# Patient Record
Sex: MALE | Race: BLACK OR AFRICAN AMERICAN | NOT HISPANIC OR LATINO | URBAN - METROPOLITAN AREA
[De-identification: names, ages, dates, MRNs, and addresses within clinical notes are randomized per-mention and may not be internally consistent; named-entity substitution may affect disease eponyms.]

---

## 2017-02-16 ENCOUNTER — HOSPITAL ENCOUNTER (OUTPATIENT)
Dept: MEDSURG UNIT | Facility: HOSPITAL | Age: 27
End: 2017-02-17

## 2020-02-10 ENCOUNTER — HISTORICAL (OUTPATIENT)
Dept: ADMINISTRATIVE | Facility: HOSPITAL | Age: 30
End: 2020-02-10

## 2022-04-07 ENCOUNTER — HISTORICAL (OUTPATIENT)
Dept: ADMINISTRATIVE | Facility: HOSPITAL | Age: 32
End: 2022-04-07
Payer: COMMERCIAL

## 2022-04-23 VITALS
WEIGHT: 223.56 LBS | BODY MASS INDEX: 27.8 KG/M2 | HEIGHT: 75 IN | SYSTOLIC BLOOD PRESSURE: 108 MMHG | DIASTOLIC BLOOD PRESSURE: 70 MMHG

## 2022-05-01 NOTE — HISTORICAL [facility]ER
This is a historical note converted from Agnes. Formatting and pictures may have been removed.  Please reference Agnes for original formatting and attached multimedia. Chief Complaint  Pt c/o right knee pain, tore ACL 4 years ago and had sx with Dr. Kirby. Referred by Dr. Clark  History of Present Illness  Patient comes in today complaint of right knee pain. ?Patient states over last couple weeks he is noted some soreness in his right knee. ?He had a previous ACL reconstruction and meniscus?over 4 years ago.? He has pain with bending of his knee. ?He denies any swelling or instability he denies other complaints.  Physical Exam  Vitals & Measurements  HR:?64(Peripheral)? BP:?130/76?  HT:?190?cm? WT:?97.0?kg? BMI:?26.87?  Right lower extremity compartment soft and warm. ?Skin is intact with no signs or symptoms of DVT or infection. ?There is no effusion his previous incisions well-healed his motion 0 to 115 degrees. ?He has some mild soreness?along the medial joint line questionable McMurrays. ?Negative anterior drawer negative Lachmans?he is otherwise stable to stressing he is neurovascular intact distally.? X-rays 3 views right knee demonstrate no obvious fracture dislocation.  Assessment/Plan  1.?Right ACL tear?S83.511A  ?At this time we discussed his physical exam and?x-ray findings.? We will start with physical therapy to gain his strength, quad strength. ?We have discussed some low impact activities. ?We have discussed anti-inflammatories with appropriate precautions.? We have discussed an MRI if he does not improve, would like to see him back in 4 weeks to see how he is progressing.  2.?Medial meniscus tear?S83.249A  Referrals  Clinic Follow up, *Est. 03/09/20 3:00:00 CDT, Order for future visit, Right ACL tear  Medial meniscus tear, [facility]  PT/OT External Referral, 02/10/20 11:00:00 CST, Right ACL tear  Medial meniscus tear, Evaluate and Treat, 3 X Week, Patient has IV, Standard Precautions    Problem List/Past Medical History  Ongoing  BMI 27.0-27.9,adult  Knee joint pain  Well adult exam  Historical  Finger sprain  Procedure/Surgical History  Arthroscopically aided anterior cruciate ligament repair/augmentation or reconstruction (06/07/2016)  Arthroscopy Knee (Right) (06/07/2016)  Arthroscopy, knee, surgical; with meniscus repair (medial AND lateral) (06/07/2016)  Injection, anesthetic agent; femoral nerve, single (06/07/2016)  Introduction of Local Anesthetic into Peripheral Nerves and Plexi, Percutaneous Approach (06/07/2016)  Repair Right Knee Joint, Percutaneous Endoscopic Approach (06/07/2016)  Supplement Right Knee Bursa and Ligament with Autologous Tissue Substitute, Percutaneous Endoscopic Approach (06/07/2016)  CLOSURE OF SKIN AND SUBCUTANEOUS TISSUE OTHER SITES (02/13/2015)  Simple repair of superficial wounds of scalp, neck, axillae, external genitalia, trunk and/or extremities (including hands and feet); 2.6 cm to 7.5 cm (02/13/2015)  Simple repair of superficial wounds of face, ears, eyelids, nose, lips and/or mucous membranes; 2.5 cm or less. (01/22/2015)  Suture of laceration of lip (01/22/2015)  Right Hand Surgery   Medications  Mobic 15 mg oral tablet, 15 mg= 1 tab(s), Oral, Daily  Allergies  No Known Allergies  Social History  Abuse/Neglect  No, 02/10/2020  Alcohol - Low Risk, 01/22/2015  Current, 1-2 times per week, 02/16/2017  Employment/School  Employed, Work/School description: [employer]. Highest education level: High school., 01/22/2020  Home/Environment  Lives with Significant other. Living situation: Home/Independent., 01/22/2020  Nutrition/Health  Regular, 06/02/2016  Sexual  Sexually active: Yes., 01/22/2020  Spiritual/Cultural  Non denomination, 01/22/2020  Substance Use - Denies Substance Abuse, 07/24/2012  Never, 06/02/2016  Tobacco - Denies Tobacco Use, 07/24/2012  Never (less than 100 in lifetime), N/A, Household tobacco concerns: No., 02/10/2020  Family History  Diabetes  mellitus type 2: Mother.  Hypertension.: Mother.  Immunizations  Vaccine Date Status Comments   influenza virus vaccine, inactivated 02/17/2017 Given Other : ?scan did not save on administration date   tetanus/diphtheria/pertussis, acel(Tdap) 01/22/2015 Given    Health Maintenance  Health Maintenance  ???Pending?(in the next year)  ??? ??OverDue  ??? ? ? ?Diabetes Screening due??and every?  ??? ??Due In Future?  ??? ? ? ?Alcohol Misuse Screening not due until??01/01/21??and every 1??year(s)  ??? ? ? ?Obesity Screening not due until??01/01/21??and every 1??year(s)  ??? ? ? ?Depression Screening not due until??01/21/21??and every 1??year(s)  ??? ? ? ?ADL Screening not due until??01/22/21??and every 1??year(s)  ??? ? ? ?Blood Pressure Screening not due until??02/09/21??and every 1??year(s)  ??? ? ? ?Body Mass Index Check not due until??02/09/21??and every 1??year(s)  ???Satisfied?(in the past 1 year)  ??? ??Satisfied?  ??? ? ? ?ADL Screening on??01/22/20.??Satisfied by Kayce Leger LPN  ??? ? ? ?Alcohol Misuse Screening on??01/22/20.??Satisfied by Kayce Leger LPN  ??? ? ? ?Blood Pressure Screening on??02/10/20.??Satisfied by Gumaro Hebert  ??? ? ? ?Body Mass Index Check on??02/10/20.??Satisfied by Gumaro Hebert  ??? ? ? ?Depression Screening on??01/22/20.??Satisfied by Kayce Leger LPN  ??? ? ? ?Diabetes Screening on??01/22/20.??Satisfied by [email or web address]  ??? ? ? ?Influenza Vaccine on??02/10/20.??Satisfied by Gumaro Hebert  ??? ? ? ?Obesity Screening on??02/10/20.??Satisfied by Gumaro Hebert  ?

## 2023-05-02 ENCOUNTER — HOSPITAL ENCOUNTER (EMERGENCY)
Facility: OTHER | Age: 33
Discharge: HOME | End: 2023-05-02
Attending: EMERGENCY MEDICINE
Payer: MEDICAID

## 2023-05-02 VITALS
OXYGEN SATURATION: 96 % | DIASTOLIC BLOOD PRESSURE: 75 MMHG | TEMPERATURE: 98 F | SYSTOLIC BLOOD PRESSURE: 142 MMHG | HEART RATE: 77 BPM | RESPIRATION RATE: 18 BRPM | WEIGHT: 305 LBS | BODY MASS INDEX: 42.7 KG/M2 | HEIGHT: 71 IN

## 2023-05-02 DIAGNOSIS — K08.89 PAIN, DENTAL: Primary | ICD-10-CM

## 2023-05-02 DIAGNOSIS — H92.09 EAR ACHE: ICD-10-CM

## 2023-05-02 PROCEDURE — 25000003 PHARM REV CODE 250: Performed by: EMERGENCY MEDICINE

## 2023-05-02 PROCEDURE — 99284 EMERGENCY DEPT VISIT MOD MDM: CPT

## 2023-05-02 PROCEDURE — 63600175 PHARM REV CODE 636 W HCPCS: Performed by: EMERGENCY MEDICINE

## 2023-05-02 PROCEDURE — 96372 THER/PROPH/DIAG INJ SC/IM: CPT | Performed by: EMERGENCY MEDICINE

## 2023-05-02 RX ORDER — IBUPROFEN 600 MG/1
600 TABLET ORAL EVERY 6 HOURS PRN
Qty: 20 TABLET | Refills: 0 | Status: SHIPPED | OUTPATIENT
Start: 2023-05-02

## 2023-05-02 RX ORDER — PENICILLIN V POTASSIUM 500 MG/1
500 TABLET, FILM COATED ORAL 4 TIMES DAILY
Qty: 40 TABLET | Refills: 0 | Status: SHIPPED | OUTPATIENT
Start: 2023-05-02 | End: 2023-05-09

## 2023-05-02 RX ORDER — PENICILLIN V POTASSIUM 500 MG/1
500 TABLET, FILM COATED ORAL
Status: COMPLETED | OUTPATIENT
Start: 2023-05-02 | End: 2023-05-02

## 2023-05-02 RX ORDER — KETOROLAC TROMETHAMINE 30 MG/ML
10 INJECTION, SOLUTION INTRAMUSCULAR; INTRAVENOUS
Status: COMPLETED | OUTPATIENT
Start: 2023-05-02 | End: 2023-05-02

## 2023-05-02 RX ORDER — ACETAMINOPHEN 500 MG
1000 TABLET ORAL
Status: COMPLETED | OUTPATIENT
Start: 2023-05-02 | End: 2023-05-02

## 2023-05-02 RX ORDER — ACETAMINOPHEN 500 MG
1000 TABLET ORAL EVERY 6 HOURS PRN
Qty: 50 TABLET | Refills: 0 | Status: SHIPPED | OUTPATIENT
Start: 2023-05-02

## 2023-05-02 RX ADMIN — ACETAMINOPHEN 1000 MG: 500 TABLET, FILM COATED ORAL at 10:05

## 2023-05-02 RX ADMIN — PENICILLIN V POTASSIUM 500 MG: 500 TABLET, FILM COATED ORAL at 10:05

## 2023-05-02 RX ADMIN — KETOROLAC TROMETHAMINE 10 MG: 30 INJECTION, SOLUTION INTRAMUSCULAR; INTRAVENOUS at 10:05

## 2023-05-03 NOTE — FIRST PROVIDER EVALUATION
Emergency Department TeleTriage Encounter Note      CHIEF COMPLAINT    Chief Complaint   Patient presents with    Dental Pain     Reports right bottom and top gum pain x 1 month       VITAL SIGNS   Initial Vitals [05/02/23 1903]   BP Pulse Resp Temp SpO2   (!) 140/95 84 18 98.8 °F (37.1 °C) 97 %      MAP       --            ALLERGIES    Review of patient's allergies indicates:  No Known Allergies    PROVIDER TRIAGE NOTE  Patient presents with complaint of dental pain for a few weeks with associated ear pain and facial swelling. Denied fever. Reports bad tooth.      Phy:   Constitutional: well nourished, well developed, appearing stated age, NAD   HEENT: NCAT, symmetrical lids, No obvious facial deformity.  Normal phonation. Normal Conjunctiva   Neck: NAROM   Respiratory: Normal effort.  No obvious use of accessory muscles   Musculoskeletal: Moved upper extremities well   Neuro: Alert, answers questions appropriately    Psych: appropriate mood and affect      Initial orders will be placed and care will be transferred to an alternate provider when patient is roomed for a full evaluation. Any additional orders and the final disposition will be determined by that provider.        ORDERS  Labs Reviewed - No data to display    ED Orders (720h ago, onward)      None              Virtual Visit Note: The provider triage portion of this emergency department evaluation and documentation was performed via [employer], a HIPAA-compliant telemedicine application, in concert with a tele-presenter in the room. A face to face patient evaluation with one of my colleagues will occur once the patient is placed in an emergency department room.      DISCLAIMER: This note was prepared with [employer] ASA voice recognition transcription software. Garbled syntax, mangled pronouns, and other bizarre constructions may be attributed to that software system.

## 2023-05-03 NOTE — ED PROVIDER NOTES
Encounter Date: 5/2/2023    SCRIBE #1 NOTE: I, Jeannette Woods, am scribing for, and in the presence of,  Americo Benson MD.     History     Chief Complaint   Patient presents with    Dental Pain     Reports right bottom and top gum pain x 1 month     This is a 32 y.o. male who presents with complaint of right sided tooth pain that worsened today. Patient reports to have on and off tooth pain, which he believes is his wisdom teeth starting to break in. He states the right sided tooth pain worsened today and began radiating to his right ear. He notes laying or leaning on his right side makes the pain worsens and feels it throbbing in his ear. He reports the pain has also caused throat pain. Patient does not report recent dentist visits or upcoming appointments. Patient reports a PMHx of smoking.     The history is provided by the patient.   Review of patient's allergies indicates:  No Known Allergies  History reviewed. No pertinent past medical history.  History reviewed. No pertinent surgical history.  History reviewed. No pertinent family history.     Review of Systems    Constitutional-no fever  HEENT-positive for dental problem, positive for ear pain (right ear), no congestion  Eyes-no redness  Respiratory-no shortness of breath  Cardio-no chest pain  GI-no abdominal pain  Endocrine-no cold intolerance  -no difficulty urinating  MSK-no myalgias  Skin-no rashes  Allergy-no environmental allergy  Neurologic-, no headache  Hematology-no swollen nodes  Behavioral-no confusion     Physical Exam     Initial Vitals [05/02/23 1903]   BP Pulse Resp Temp SpO2   (!) 140/95 84 18 98.8 °F (37.1 °C) 97 %      MAP       --         Physical Exam    Constitutional: Well appearing, no distress.  Eyes: Conjunctivae normal.  ENT       Head: Normocephalic, atraumatic.       Nose: No congestion.       Mouth/Throat: poor dentition, moderate TTP among many teeth with caries  Hematological/Lymphatic/Immunilogical: No cervical  lymphadenopathy.  Cardiovascular: Normal rate, regular rhythm. Normal and symmetric distal pulses.  Respiratory: Normal respiratory effort. Breath sounds are normal.  Gastrointestinal: Soft, nontender.   Musculoskeletal: Normal range of motion in all extremities. No obvious deformities or swelling.  Neurologic: Alert, oriented. Normal speech and language. No gross focal neurologic deficits are appreciated.  Skin: Skin is warm, dry. No rash noted.  Psychiatric: Mood and affect are normal.      ED Course   Procedures  Labs Reviewed - No data to display       Imaging Results    None          Medications   ketorolac injection 9.999 mg (9.999 mg Intramuscular Given 5/2/23 2211)   acetaminophen tablet 1,000 mg (1,000 mg Oral Given 5/2/23 2211)   penicillin v potassium tablet 500 mg (500 mg Oral Given 5/2/23 2211)     Medical Decision Making:   History:   Old Medical Records: I decided to obtain old medical records.  Old Records Summarized: records from clinic visits.  Differential Diagnosis:   Pulpitis, abscess,  ED Management:  Obese man with dental pain, chronic poor dentition.   No sign of ludwigs angina at this time. Plan for symptom care.   DC return in case of worsening.         Scribe Attestation:   Scribe #1: I performed the above scribed service and the documentation accurately describes the services I performed. I attest to the accuracy of the note.            Physician Attestation for Scribe: I, americo benson, reviewed documentation as scribed in my presence, which is both accurate and complete.        Clinical Impression:   Final diagnoses:  [K08.89] Pain, dental (Primary)  [H92.09] Ear ache        ED Disposition Condition    Discharge Stable          ED Prescriptions       Medication Sig Dispense Start Date End Date Auth. Provider    penicillin v potassium (VEETID) 500 MG tablet Take 1 tablet (500 mg total) by mouth 4 (four) times daily. for 7 days 40 tablet 5/2/2023 5/9/2023 Americo Benson MD     ibuprofen (ADVIL,MOTRIN) 600 MG tablet Take 1 tablet (600 mg total) by mouth every 6 (six) hours as needed for Pain. 20 tablet 5/2/2023 -- Americo Benson MD    acetaminophen (TYLENOL) 500 MG tablet Take 2 tablets (1,000 mg total) by mouth every 6 (six) hours as needed. 50 tablet 5/2/2023 -- Americo Benson MD          Follow-up Information       Follow up With Specialties Details Why Contact Info    Your Dentist  Schedule an appointment as soon as possible for a visit  For a follow up visit about today              Americo Benson MD  05/04/23 1942

## 2023-05-03 NOTE — DISCHARGE INSTRUCTIONS
Mr. Smith,    Thank you for letting me care for you today! It was nice meeting you, and I hope you feel better soon.   If you would like access to your chart and what was done today please utilize the [facility].   Please come back to [facility] for all of your future medical needs.    Our goal in the emergency department is to always give you outstanding care and exceptional service. You may receive a survey by mail or e-mail in the next week regarding your experience in our ED. We would greatly appreciate you completing and returning the survey. Your feedback provides us with a way to recognize our staff who give very good care and it helps us learn how to improve when your experience was below our aspiration of excellence.     Sincerely,    Americo Benson MD  Board Certified Emergency Physician

## 2023-05-03 NOTE — ED TRIAGE NOTES
Pt arrived with c/o R sided dental pain and R sided ear pain x 1 month, worsening today.  Took 2 Ibuprofen with no relief.  Pt denies any fever.  Pt reports inflammation to his gums on the R side, but denies any drainage.

## 2023-11-19 ENCOUNTER — HOSPITAL ENCOUNTER (EMERGENCY)
Facility: HOSPITAL | Age: 33
Discharge: HOME | End: 2023-11-19
Attending: INTERNAL MEDICINE
Payer: MEDICAID

## 2023-11-19 VITALS
RESPIRATION RATE: 16 BRPM | HEIGHT: 70 IN | HEART RATE: 69 BPM | TEMPERATURE: 98 F | WEIGHT: 315 LBS | DIASTOLIC BLOOD PRESSURE: 89 MMHG | SYSTOLIC BLOOD PRESSURE: 148 MMHG | OXYGEN SATURATION: 98 % | BODY MASS INDEX: 45.1 KG/M2

## 2023-11-19 DIAGNOSIS — F19.10 SUBSTANCE ABUSE: Primary | ICD-10-CM

## 2023-11-19 LAB
AMPHET UR QL SCN: NEGATIVE
BARBITURATE SCN PRESENT UR: NEGATIVE
BENZODIAZ UR QL SCN: NEGATIVE
CANNABINOIDS UR QL SCN: NEGATIVE
COCAINE UR QL SCN: NEGATIVE
ETHANOL SERPL-MCNC: <10 MG/DL
FENTANYL UR QL SCN: POSITIVE
HOLD SPECIMEN: NORMAL
HOLD SPECIMEN: NORMAL
MDMA UR QL SCN: NEGATIVE
OPIATES UR QL SCN: NEGATIVE
PCP UR QL: NEGATIVE
PH UR: 8 [PH] (ref 3–11)

## 2023-11-19 PROCEDURE — 99283 EMERGENCY DEPT VISIT LOW MDM: CPT

## 2023-11-19 PROCEDURE — 63600175 PHARM REV CODE 636 W HCPCS: Performed by: INTERNAL MEDICINE

## 2023-11-19 PROCEDURE — 82077 ASSAY SPEC XCP UR&BREATH IA: CPT | Performed by: INTERNAL MEDICINE

## 2023-11-19 PROCEDURE — 25000003 PHARM REV CODE 250: Performed by: INTERNAL MEDICINE

## 2023-11-19 PROCEDURE — 80307 DRUG TEST PRSMV CHEM ANLYZR: CPT | Performed by: INTERNAL MEDICINE

## 2023-11-19 RX ORDER — ARIPIPRAZOLE 5 MG/1
5 TABLET ORAL
COMMUNITY
Start: 2023-06-01

## 2023-11-19 RX ORDER — BUPRENORPHINE AND NALOXONE 2; .5 MG/1; MG/1
1 FILM, SOLUBLE BUCCAL; SUBLINGUAL DAILY
Status: DISCONTINUED | OUTPATIENT
Start: 2023-11-19 | End: 2023-11-19 | Stop reason: HOSPADM

## 2023-11-19 RX ORDER — ONDANSETRON 4 MG/1
4 TABLET, ORALLY DISINTEGRATING ORAL
Status: COMPLETED | OUTPATIENT
Start: 2023-11-19 | End: 2023-11-19

## 2023-11-19 RX ADMIN — BUPRENORPHINE AND NALOXONE 1 FILM: 2; .5 FILM BUCCAL; SUBLINGUAL at 02:11

## 2023-11-19 RX ADMIN — ONDANSETRON 4 MG: 4 TABLET, ORALLY DISINTEGRATING ORAL at 02:11

## 2023-11-19 NOTE — ED PROVIDER NOTES
Encounter Date: 11/19/2023       History     Chief Complaint   Patient presents with    Suicidal    Addiction Problem     Patient reports detoxing from Heroin; last use was last night. Reports feeling down and suicidal this morning.     Presents requesting help with detox and rehabilitation for heroin abuse.    The history is provided by the patient.     Review of patient's allergies indicates:  No Known Allergies  History reviewed. No pertinent past medical history.  History reviewed. No pertinent surgical history.  History reviewed. No pertinent family history.  Social History     Tobacco Use    Smoking status: Every Day     Current packs/day: 0.50     Types: Cigarettes    Smokeless tobacco: Never   Substance Use Topics    Drug use: Yes     Comment: Heroin     Review of Systems   Constitutional:  Negative for fever.   HENT:  Negative for sore throat.    Respiratory:  Negative for shortness of breath.    Cardiovascular:  Negative for chest pain.   Gastrointestinal:  Negative for nausea.   Genitourinary:  Negative for dysuria.   Musculoskeletal:  Negative for back pain.   Skin:  Negative for rash.   Neurological:  Negative for weakness.   Hematological:  Does not bruise/bleed easily.   Psychiatric/Behavioral:  Negative for self-injury and suicidal ideas.    All other systems reviewed and are negative.      Physical Exam     Initial Vitals [11/19/23 1347]   BP Pulse Resp Temp SpO2   (!) 156/83 72 14 98.8 °F (37.1 °C) 97 %      MAP       --         Physical Exam    Nursing note and vitals reviewed.  Constitutional: He appears well-developed and well-nourished. No distress.   HENT:   Head: Normocephalic and atraumatic.   Nose: Nose normal.   Mouth/Throat: Oropharynx is clear and moist. No oropharyngeal exudate.   Eyes: Conjunctivae and EOM are normal. Pupils are equal, round, and reactive to light.   Neck: Neck supple. No thyromegaly present. No JVD present.   Normal range of motion.  Cardiovascular:  Normal rate,  regular rhythm, normal heart sounds and intact distal pulses.           Pulmonary/Chest: Breath sounds normal. No respiratory distress.   Abdominal: Abdomen is soft. Bowel sounds are normal. He exhibits no distension. There is no abdominal tenderness. There is no rebound and no guarding.   Musculoskeletal:         General: No edema. Normal range of motion.      Cervical back: Normal range of motion and neck supple.     Neurological: He is alert and oriented to person, place, and time. He has normal strength. GCS score is 15. GCS eye subscore is 4. GCS verbal subscore is 5. GCS motor subscore is 6.   Skin: Skin is warm and dry. No rash noted.   Psychiatric: His behavior is normal. Thought content normal.         ED Course   Procedures  Labs Reviewed   DRUG SCREEN, URINE (BEAKER) - Abnormal; Notable for the following components:       Result Value    Fentanyl, Urine Positive (*)     All other components within normal limits    Narrative:     Cut off concentrations:    Amphetamines - 1000 ng/ml  Barbiturates - 200 ng/ml  Benzodiazepine - 200 ng/ml  Cannabinoids (THC) - 50 ng/ml  Cocaine - 300 ng/ml  Fentanyl - 1.0 ng/ml  MDMA - 500 ng/ml  Opiates - 300 ng/ml   Phencyclidine (PCP) - 25 ng/ml    Specimen submitted for drug analysis and tested for pH and specific gravity in order to evaluate sample integrity. Suspect tampering if specific gravity is <1.003 and/or pH is not within the range of 4.5 - 8.0  False negatives may result form substances such as bleach added to urine.  False positives may result for the presence of a substance with similar chemical structure to the drug or its metabolite.    This test provides only a PRELIMINARY analytical test result. A more specific alternate chemical method must be used in order to obtain a confirmed analytical result. Gas chromatography/mass spectrometry (GC/MS) is the preferred confirmatory method. Other chemical confirmation methods are available. Clinical consideration and  professional judgement should be applied to any drug of abuse test result, particularly when preliminary positive results are used.    Positive results will be confirmed only at the physicians request. Unconfirmed screening results are to be used only for medical purposes (treatment).        ALCOHOL,MEDICAL (ETHANOL) - Normal   EXTRA TUBES    Narrative:     The following orders were created for panel order EXTRA TUBES.  Procedure                               Abnormality         Status                     ---------                               -----------         ------                     Lavender Top Hold[[id number]]                                Final result               Gold Top Hold[[id number]]                                    Final result                 Please view results for these tests on the individual orders.   LAVENDER TOP HOLD   GOLD TOP HOLD          Imaging Results    None          Medications   ondansetron disintegrating tablet 4 mg (4 mg Oral Given 11/19/23 1406)     Medical Decision Making  Amount and/or Complexity of Data Reviewed  Labs: ordered. Decision-making details documented in ED Course.  Discussion of management or test interpretation with external provider(s): [facility] service consulted for rehabilitation placement    Risk  Prescription drug management.                        I provided the patient with counseling regarding opioid abuse complications, consequences and alternatives of treatment. We spoke about rehabilitation programs and a list of available alternatives was provided. I provide the patient with a prescription for naloxone and instructions of use were given. I encourage the patient to teach his friends and family members in naloxone emergency use.  The patient understood his condition and the importance of obtaining adequate addiction counseling and rehabilitation help.       [facility] service consulted and patent placed in a rehabilitation center. Narcan was provided  bedside      Clinical Impression:  Final diagnoses:  [F19.10] Substance abuse (Primary)          ED Disposition Condition    Discharge           ED Prescriptions    None       Follow-up Information       Follow up With Specialties Details Why Contact Info    [facility] - Emergency Dept Emergency Medicine Schedule an appointment as soon as possible for a visit   [address] [id number] [phone]    [facility]  Schedule an appointment as soon as possible for a visit in 2 months  [address] [address]    [facility] Behavioral Health, Psychiatry, Psychology Schedule an appointment as soon as possible for a visit in 1 month  [address] [place] [id number] [phone]               Trev Linder MD  11/19/23 1920       Trev Linder MD  11/19/23 1921

## 2024-08-25 ENCOUNTER — HOSPITAL ENCOUNTER (EMERGENCY)
Facility: HOSPITAL | Age: 34
Discharge: HOME | End: 2024-08-25
Attending: EMERGENCY MEDICINE
Payer: MEDICAID

## 2024-08-25 VITALS
SYSTOLIC BLOOD PRESSURE: 135 MMHG | BODY MASS INDEX: 45.2 KG/M2 | TEMPERATURE: 98 F | HEIGHT: 70 IN | RESPIRATION RATE: 20 BRPM | DIASTOLIC BLOOD PRESSURE: 85 MMHG | OXYGEN SATURATION: 100 % | HEART RATE: 78 BPM

## 2024-08-25 DIAGNOSIS — F19.10 DRUG ABUSE: Primary | ICD-10-CM

## 2024-08-25 LAB
ALBUMIN SERPL-MCNC: 3.3 G/DL (ref 3.5–5)
ALBUMIN/GLOB SERPL: 1 RATIO (ref 1.1–2)
ALP SERPL-CCNC: 80 UNIT/L (ref 40–150)
ALT SERPL-CCNC: 27 UNIT/L (ref 0–55)
AMPHET UR QL SCN: NEGATIVE
ANION GAP SERPL CALC-SCNC: 7 MEQ/L
APAP SERPL-MCNC: <3 UG/ML (ref 10–30)
AST SERPL-CCNC: 18 UNIT/L (ref 5–34)
BACTERIA #/AREA URNS AUTO: ABNORMAL /HPF
BARBITURATE SCN PRESENT UR: NEGATIVE
BASOPHILS # BLD AUTO: 0.02 X10(3)/MCL
BASOPHILS NFR BLD AUTO: 0.3 %
BENZODIAZ UR QL SCN: POSITIVE
BILIRUB SERPL-MCNC: 0.2 MG/DL
BILIRUB UR QL STRIP.AUTO: NEGATIVE
BUN SERPL-MCNC: 6.6 MG/DL (ref 8.9–20.6)
CALCIUM SERPL-MCNC: 9.4 MG/DL (ref 8.4–10.2)
CANNABINOIDS UR QL SCN: NEGATIVE
CHLORIDE SERPL-SCNC: 104 MMOL/L (ref 98–107)
CLARITY UR: CLEAR
CO2 SERPL-SCNC: 28 MMOL/L (ref 22–29)
COCAINE UR QL SCN: NEGATIVE
COLOR UR AUTO: ABNORMAL
CREAT SERPL-MCNC: 0.75 MG/DL (ref 0.73–1.18)
CREAT/UREA NIT SERPL: 9
EOSINOPHIL # BLD AUTO: 0.13 X10(3)/MCL (ref 0–0.9)
EOSINOPHIL NFR BLD AUTO: 1.8 %
ERYTHROCYTE [DISTWIDTH] IN BLOOD BY AUTOMATED COUNT: 13.2 % (ref 11.5–17)
ETHANOL SERPL-MCNC: <10 MG/DL
FENTANYL UR QL SCN: POSITIVE
GFR SERPLBLD CREATININE-BSD FMLA CKD-EPI: >60 ML/MIN/1.73/M2
GLOBULIN SER-MCNC: 3.4 GM/DL (ref 2.4–3.5)
GLUCOSE SERPL-MCNC: 112 MG/DL (ref 74–100)
GLUCOSE UR QL STRIP: NORMAL
HCT VFR BLD AUTO: 39.9 % (ref 42–52)
HGB BLD-MCNC: 12.6 G/DL (ref 14–18)
HGB UR QL STRIP: NEGATIVE
HYALINE CASTS #/AREA URNS LPF: ABNORMAL /LPF
IMM GRANULOCYTES # BLD AUTO: 0.02 X10(3)/MCL (ref 0–0.04)
IMM GRANULOCYTES NFR BLD AUTO: 0.3 %
KETONES UR QL STRIP: NEGATIVE
LEUKOCYTE ESTERASE UR QL STRIP: NEGATIVE
LYMPHOCYTES # BLD AUTO: 2.66 X10(3)/MCL (ref 0.6–4.6)
LYMPHOCYTES NFR BLD AUTO: 36.2 %
MCH RBC QN AUTO: 26.9 PG (ref 27–31)
MCHC RBC AUTO-ENTMCNC: 31.6 G/DL (ref 33–36)
MCV RBC AUTO: 85.1 FL (ref 80–94)
MDMA UR QL SCN: NEGATIVE
MONOCYTES # BLD AUTO: 0.45 X10(3)/MCL (ref 0.1–1.3)
MONOCYTES NFR BLD AUTO: 6.1 %
MUCOUS THREADS URNS QL MICRO: ABNORMAL /LPF
NEUTROPHILS # BLD AUTO: 4.06 X10(3)/MCL (ref 2.1–9.2)
NEUTROPHILS NFR BLD AUTO: 55.3 %
NITRITE UR QL STRIP: NEGATIVE
NRBC BLD AUTO-RTO: 0 %
OPIATES UR QL SCN: POSITIVE
PCP UR QL: NEGATIVE
PH UR STRIP: 8 [PH]
PH UR: 8 [PH] (ref 3–11)
PLATELET # BLD AUTO: 280 X10(3)/MCL (ref 130–400)
PMV BLD AUTO: 10.2 FL (ref 7.4–10.4)
POTASSIUM SERPL-SCNC: 3.5 MMOL/L (ref 3.5–5.1)
PROT SERPL-MCNC: 6.7 GM/DL (ref 6.4–8.3)
PROT UR QL STRIP: NEGATIVE
RBC # BLD AUTO: 4.69 X10(6)/MCL (ref 4.7–6.1)
RBC #/AREA URNS AUTO: ABNORMAL /HPF
SALICYLATES SERPL-MCNC: <5 MG/DL (ref 15–30)
SARS-COV-2 RDRP RESP QL NAA+PROBE: NEGATIVE
SODIUM SERPL-SCNC: 139 MMOL/L (ref 136–145)
SP GR UR STRIP.AUTO: 1.02 (ref 1–1.03)
SPECIFIC GRAVITY, URINE AUTO (.000) (OHS): 1.02 (ref 1–1.03)
SQUAMOUS #/AREA URNS LPF: ABNORMAL /HPF
UROBILINOGEN UR STRIP-ACNC: NORMAL
WBC # BLD AUTO: 7.34 X10(3)/MCL (ref 4.5–11.5)
WBC #/AREA URNS AUTO: ABNORMAL /HPF

## 2024-08-25 PROCEDURE — 80179 DRUG ASSAY SALICYLATE: CPT | Performed by: EMERGENCY MEDICINE

## 2024-08-25 PROCEDURE — 80143 DRUG ASSAY ACETAMINOPHEN: CPT | Performed by: EMERGENCY MEDICINE

## 2024-08-25 PROCEDURE — 82077 ASSAY SPEC XCP UR&BREATH IA: CPT | Performed by: EMERGENCY MEDICINE

## 2024-08-25 PROCEDURE — 99283 EMERGENCY DEPT VISIT LOW MDM: CPT

## 2024-08-25 PROCEDURE — 81001 URINALYSIS AUTO W/SCOPE: CPT | Performed by: EMERGENCY MEDICINE

## 2024-08-25 PROCEDURE — U0002 COVID-19 LAB TEST NON-CDC: HCPCS | Performed by: EMERGENCY MEDICINE

## 2024-08-25 PROCEDURE — 80053 COMPREHEN METABOLIC PANEL: CPT | Performed by: EMERGENCY MEDICINE

## 2024-08-25 PROCEDURE — 85025 COMPLETE CBC W/AUTO DIFF WBC: CPT | Performed by: EMERGENCY MEDICINE

## 2024-08-25 PROCEDURE — 80307 DRUG TEST PRSMV CHEM ANLYZR: CPT | Performed by: EMERGENCY MEDICINE

## 2024-08-26 NOTE — ED PROVIDER NOTES
Encounter Date: 8/25/2024       History     Chief Complaint   Patient presents with    Drug / Alcohol Assessment     Pt. Reports heroin use and wants to get into facility for detox. Denies SI/HI at this time.      Patient reporting several months of sobriety from snorted opiate misuse, relapsed earlier today.  Presents this evening requesting links to rehabilitative services.  Patient is reporting mild depressive symptoms, without suicidal ideation.  There is no past medical history aside from a substance misuse.  Patient takes no routine meds, has no allergies.  Patient is alert, calm, cooperative in the ED.        Review of patient's allergies indicates:  No Known Allergies  History reviewed. No pertinent past medical history.  History reviewed. No pertinent surgical history.  No family history on file.  Social History     Tobacco Use    Smoking status: Every Day     Current packs/day: 0.50     Types: Cigarettes    Smokeless tobacco: Never   Substance Use Topics    Drug use: Yes     Comment: Heroin     Review of Systems    Physical Exam     Initial Vitals [08/25/24 1940]   BP Pulse Resp Temp SpO2   135/85 78 16 97.9 °F (36.6 °C) 98 %      MAP       --         Physical Exam    Nursing note and vitals reviewed.  Constitutional: He appears well-developed and well-nourished. He is not diaphoretic. No distress.   HENT:   Head: Normocephalic and atraumatic.   Eyes: EOM are normal. Pupils are equal, round, and reactive to light. Right eye exhibits no discharge. Left eye exhibits no discharge.   Neck: Neck supple. No thyromegaly present. No tracheal deviation present. No JVD present.   Normal range of motion.  Cardiovascular:  Normal rate, regular rhythm, normal heart sounds and intact distal pulses.           No murmur heard.  Pulmonary/Chest: Breath sounds normal. No stridor. No respiratory distress. He has no wheezes. He has no rhonchi. He has no rales.   Abdominal: Abdomen is soft. He exhibits no distension. There is  no abdominal tenderness. There is no rebound and no guarding.   Musculoskeletal:         General: No tenderness or edema. Normal range of motion.      Cervical back: Normal range of motion and neck supple.     Neurological: He is alert and oriented to person, place, and time. He has normal strength. No cranial nerve deficit. GCS score is 15. GCS eye subscore is 4. GCS verbal subscore is 5. GCS motor subscore is 6.   Skin: Skin is warm and dry. Capillary refill takes less than 2 seconds. No rash and no abscess noted. No erythema. No pallor.   Psychiatric: He has a normal mood and affect. His behavior is normal. Judgment and thought content normal.         ED Course   Procedures  Labs Reviewed   DRUG SCREEN, URINE (BEAKER) - Abnormal       Result Value    Amphetamines, Urine Negative      Barbiturates, Urine Negative      Benzodiazepine, Urine Positive (*)     Cannabinoids, Urine Negative      Cocaine, Urine Negative      Fentanyl, Urine Positive (*)     MDMA, Urine Negative      Opiates, Urine Positive (*)     Phencyclidine, Urine Negative      pH, Urine 8.0      Specific Gravity, Urine Auto 1.020      Narrative:     Cut off concentrations:    Amphetamines - 1000 ng/ml  Barbiturates - 200 ng/ml  Benzodiazepine - 200 ng/ml  Cannabinoids (THC) - 50 ng/ml  Cocaine - 300 ng/ml  Fentanyl - 1.0 ng/ml  MDMA - 500 ng/ml  Opiates - 300 ng/ml   Phencyclidine (PCP) - 25 ng/ml    Specimen submitted for drug analysis and tested for pH and specific gravity in order to evaluate sample integrity. Suspect tampering if specific gravity is <1.003 and/or pH is not within the range of 4.5 - 8.0  False negatives may result form substances such as bleach added to urine.  False positives may result for the presence of a substance with similar chemical structure to the drug or its metabolite.    This test provides only a PRELIMINARY analytical test result. A more specific alternate chemical method must be used in order to obtain a confirmed  analytical result. Gas chromatography/mass spectrometry (GC/MS) is the preferred confirmatory method. Other chemical confirmation methods are available. Clinical consideration and professional judgement should be applied to any drug of abuse test result, particularly when preliminary positive results are used.    Positive results will be confirmed only at the physicians request. Unconfirmed screening results are to be used only for medical purposes (treatment).        URINALYSIS, REFLEX TO URINE CULTURE - Abnormal    Color, UA Light-Yellow      Appearance, UA Clear      Specific Gravity, UA 1.020      pH, UA 8.0      Protein, UA Negative      Glucose, UA Normal      Ketones, UA Negative      Blood, UA Negative      Bilirubin, UA Negative      Urobilinogen, UA Normal      Nitrites, UA Negative      Leukocyte Esterase, UA Negative      RBC, UA 0-5      WBC, UA 0-5      Bacteria, UA None Seen      Squamous Epithelial Cells, UA None Seen      Mucous, UA Trace (*)     Hyaline Casts, UA None Seen     SALICYLATE LEVEL - Abnormal    Salicylate Level <5.0 (*)    ACETAMINOPHEN LEVEL - Abnormal    Acetaminophen Level <3.0 (*)    COMPREHENSIVE METABOLIC PANEL - Abnormal    Sodium 139      Potassium 3.5      Chloride 104      CO2 28      Glucose 112 (*)     Blood Urea Nitrogen 6.6 (*)     Creatinine 0.75      Calcium 9.4      Protein Total 6.7      Albumin 3.3 (*)     Globulin 3.4      Albumin/Globulin Ratio 1.0 (*)     Bilirubin Total 0.2      ALP 80      ALT 27      AST 18      eGFR >60      Anion Gap 7.0      BUN/Creatinine Ratio 9     CBC WITH DIFFERENTIAL - Abnormal    WBC 7.34      RBC 4.69 (*)     Hgb 12.6 (*)     Hct 39.9 (*)     MCV 85.1      MCH 26.9 (*)     MCHC 31.6 (*)     RDW 13.2      Platelet 280      MPV 10.2      Neut % 55.3      Lymph % 36.2      Mono % 6.1      Eos % 1.8      Basophil % 0.3      Lymph # 2.66      Neut # 4.06      Mono # 0.45      Eos # 0.13      Baso # 0.02      IG# 0.02      IG% 0.3       NRBC% 0.0     SARS-COV-2 RNA AMPLIFICATION, QUAL - Normal    SARS COV-2 Molecular Negative      Narrative:     The IDNOW COVID-19 assay is a rapid molecular in vitro diagnostic test utilizing an isothermal nucleic acid amplification technology intended for the qualitative detection of nucleic acid from the SARS-CoV-2 viral RNA in direct nasal, nasopharyngeal or throat swabs from individuals who are suspected of COVID-19 by their healthcare provider.   ALCOHOL,MEDICAL (ETHANOL) - Normal    Ethanol Level <10.0     CBC W/ AUTO DIFFERENTIAL    Narrative:     The following orders were created for panel order CBC Auto Differential.  Procedure                               Abnormality         Status                     ---------                               -----------         ------                     CBC with Differential[[id number]]        Abnormal            Final result                 Please view results for these tests on the individual orders.          Imaging Results    None          Medications - No data to display  Medical Decision Making  Amount and/or Complexity of Data Reviewed  External Data Reviewed: notes.  Labs: ordered. Decision-making details documented in ED Course.     Details: As above;  Discussion of management or test interpretation with external provider(s): [facility] team is consulted, agrees appropriate for placement rehabilitative services;    Risk  Decision regarding hospitalization.  Risk Details: Risk found sufficient to obtain expanded evaluation with objective data.  The objective data resulted in found reassuring.  Patient achieves medical clearance and is referred for substance abuse rehabilitative services.               ED Course as of 08/25/24 2100   Sun Aug 25, 2024   2037 Reassuring chemistries; [CT]   2037 Negative Tylenol, salicylate, ethanol levels; [CT]   2037 Reassuring hemogram; [CT]   2037 COVID-19 testing negative; [CT]   2038 Reassuring urinalysis; [CT]   2038 Urine  toxicology positive for benzodiazepines, fentanyl; [CT]      ED Course User Index  [CT] Gilbert Pickett MD                           Clinical Impression:  Final diagnoses:  [F19.10] Drug abuse (Primary)          ED Disposition Condition    Discharge Stable          ED Prescriptions    None       Follow-up Information       Follow up With Specialties Details Why Contact Info    [facility] Emergency Medicine  As needed, If symptoms worsen [address] [place] [id number] [phone]             Gilbert Pickett MD  08/25/24 2100

## 2025-07-07 ENCOUNTER — ANESTHESIA (OUTPATIENT)
Dept: SURGERY | Facility: HOSPITAL | Age: 35
DRG: 354 | End: 2025-07-07
Payer: MEDICAID

## 2025-07-07 ENCOUNTER — HOSPITAL ENCOUNTER (INPATIENT)
Facility: HOSPITAL | Age: 35
LOS: 4 days | Discharge: HOME | DRG: 354 | End: 2025-07-11
Attending: STUDENT IN AN ORGANIZED HEALTH CARE EDUCATION/TRAINING PROGRAM | Admitting: SURGERY
Payer: MEDICAID

## 2025-07-07 ENCOUNTER — ANESTHESIA EVENT (OUTPATIENT)
Dept: SURGERY | Facility: HOSPITAL | Age: 35
DRG: 354 | End: 2025-07-07
Payer: MEDICAID

## 2025-07-07 DIAGNOSIS — K43.6 VENTRAL HERNIA WITH OBSTRUCTION: ICD-10-CM

## 2025-07-07 DIAGNOSIS — K42.9 UMBILICAL HERNIA WITHOUT OBSTRUCTION AND WITHOUT GANGRENE: Primary | ICD-10-CM

## 2025-07-07 DIAGNOSIS — R10.9 ABDOMINAL PAIN, UNSPECIFIED ABDOMINAL LOCATION: ICD-10-CM

## 2025-07-07 LAB
ALBUMIN SERPL-MCNC: 3.7 G/DL (ref 3.5–5)
ALBUMIN/GLOB SERPL: 0.8 RATIO (ref 1.1–2)
ALP SERPL-CCNC: 81 UNIT/L (ref 40–150)
ALT SERPL-CCNC: 16 UNIT/L (ref 0–55)
ANION GAP SERPL CALC-SCNC: 10 MEQ/L
APTT PPP: 29 SECONDS (ref 23.2–33.7)
AST SERPL-CCNC: 18 UNIT/L (ref 11–45)
BASOPHILS # BLD AUTO: 0.03 X10(3)/MCL
BASOPHILS NFR BLD AUTO: 0.4 %
BILIRUB SERPL-MCNC: 0.5 MG/DL
BUN SERPL-MCNC: 17.1 MG/DL (ref 8.9–20.6)
CALCIUM SERPL-MCNC: 9.9 MG/DL (ref 8.4–10.2)
CHLORIDE SERPL-SCNC: 101 MMOL/L (ref 98–107)
CO2 SERPL-SCNC: 32 MMOL/L (ref 22–29)
CREAT SERPL-MCNC: 0.84 MG/DL (ref 0.72–1.25)
CREAT/UREA NIT SERPL: 20
EOSINOPHIL # BLD AUTO: 0.08 X10(3)/MCL (ref 0–0.9)
EOSINOPHIL NFR BLD AUTO: 1.1 %
ERYTHROCYTE [DISTWIDTH] IN BLOOD BY AUTOMATED COUNT: 13.2 % (ref 11.5–17)
GFR SERPLBLD CREATININE-BSD FMLA CKD-EPI: >60 ML/MIN/1.73/M2
GLOBULIN SER-MCNC: 4.7 GM/DL (ref 2.4–3.5)
GLUCOSE SERPL-MCNC: 112 MG/DL (ref 74–100)
HCT VFR BLD AUTO: 49.9 % (ref 42–52)
HGB BLD-MCNC: 15.3 G/DL (ref 14–18)
IMM GRANULOCYTES # BLD AUTO: 0.01 X10(3)/MCL (ref 0–0.04)
IMM GRANULOCYTES NFR BLD AUTO: 0.1 %
INR PPP: 1.1
LACTATE SERPL-SCNC: 1.3 MMOL/L (ref 0.5–2.2)
LIPASE SERPL-CCNC: 15 U/L
LYMPHOCYTES # BLD AUTO: 1.46 X10(3)/MCL (ref 0.6–4.6)
LYMPHOCYTES NFR BLD AUTO: 19.5 %
MCH RBC QN AUTO: 26.2 PG (ref 27–31)
MCHC RBC AUTO-ENTMCNC: 30.7 G/DL (ref 33–36)
MCV RBC AUTO: 85.6 FL (ref 80–94)
MONOCYTES # BLD AUTO: 0.83 X10(3)/MCL (ref 0.1–1.3)
MONOCYTES NFR BLD AUTO: 11.1 %
NEUTROPHILS # BLD AUTO: 5.09 X10(3)/MCL (ref 2.1–9.2)
NEUTROPHILS NFR BLD AUTO: 67.8 %
NRBC BLD AUTO-RTO: 0 %
PLATELET # BLD AUTO: 321 X10(3)/MCL (ref 130–400)
PMV BLD AUTO: 11.1 FL (ref 7.4–10.4)
POTASSIUM SERPL-SCNC: 3.5 MMOL/L (ref 3.5–5.1)
PROT SERPL-MCNC: 8.4 GM/DL (ref 6.4–8.3)
PROTHROMBIN TIME: 14.4 SECONDS (ref 12.5–14.5)
RBC # BLD AUTO: 5.83 X10(6)/MCL (ref 4.7–6.1)
SODIUM SERPL-SCNC: 143 MMOL/L (ref 136–145)
WBC # BLD AUTO: 7.5 X10(3)/MCL (ref 4.5–11.5)

## 2025-07-07 PROCEDURE — 76937 US GUIDE VASCULAR ACCESS: CPT | Performed by: ANESTHESIOLOGY

## 2025-07-07 PROCEDURE — 25000003 PHARM REV CODE 250: Performed by: STUDENT IN AN ORGANIZED HEALTH CARE EDUCATION/TRAINING PROGRAM

## 2025-07-07 PROCEDURE — 99223 1ST HOSP IP/OBS HIGH 75: CPT | Mod: 25,,, | Performed by: SURGERY

## 2025-07-07 PROCEDURE — D9220A PRA ANESTHESIA: Mod: ANES,,, | Performed by: ANESTHESIOLOGY

## 2025-07-07 PROCEDURE — 63600175 PHARM REV CODE 636 W HCPCS: Performed by: ANESTHESIOLOGY

## 2025-07-07 PROCEDURE — D9220A PRA ANESTHESIA: Mod: CRNA,,,

## 2025-07-07 PROCEDURE — G0378 HOSPITAL OBSERVATION PER HR: HCPCS

## 2025-07-07 PROCEDURE — 63600175 PHARM REV CODE 636 W HCPCS

## 2025-07-07 PROCEDURE — 96375 TX/PRO/DX INJ NEW DRUG ADDON: CPT

## 2025-07-07 PROCEDURE — 85730 THROMBOPLASTIN TIME PARTIAL: CPT | Performed by: STUDENT IN AN ORGANIZED HEALTH CARE EDUCATION/TRAINING PROGRAM

## 2025-07-07 PROCEDURE — 27000221 HC OXYGEN, UP TO 24 HOURS

## 2025-07-07 PROCEDURE — 11000001 HC ACUTE MED/SURG PRIVATE ROOM

## 2025-07-07 PROCEDURE — 25000003 PHARM REV CODE 250

## 2025-07-07 PROCEDURE — 25500020 PHARM REV CODE 255: Performed by: STUDENT IN AN ORGANIZED HEALTH CARE EDUCATION/TRAINING PROGRAM

## 2025-07-07 PROCEDURE — 96372 THER/PROPH/DIAG INJ SC/IM: CPT

## 2025-07-07 PROCEDURE — 96361 HYDRATE IV INFUSION ADD-ON: CPT

## 2025-07-07 PROCEDURE — 83605 ASSAY OF LACTIC ACID: CPT | Performed by: STUDENT IN AN ORGANIZED HEALTH CARE EDUCATION/TRAINING PROGRAM

## 2025-07-07 PROCEDURE — 49594 RPR AA HRN 1ST 3-10 NCR/STRN: CPT | Mod: ,,, | Performed by: SURGERY

## 2025-07-07 PROCEDURE — 36000706: Performed by: SURGERY

## 2025-07-07 PROCEDURE — 71000033 HC RECOVERY, INTIAL HOUR: Performed by: SURGERY

## 2025-07-07 PROCEDURE — 99900031 HC PATIENT EDUCATION (STAT)

## 2025-07-07 PROCEDURE — 63600175 PHARM REV CODE 636 W HCPCS: Performed by: STUDENT IN AN ORGANIZED HEALTH CARE EDUCATION/TRAINING PROGRAM

## 2025-07-07 PROCEDURE — 99900035 HC TECH TIME PER 15 MIN (STAT)

## 2025-07-07 PROCEDURE — 63600175 PHARM REV CODE 636 W HCPCS: Mod: JZ,TB | Performed by: STUDENT IN AN ORGANIZED HEALTH CARE EDUCATION/TRAINING PROGRAM

## 2025-07-07 PROCEDURE — 37000009 HC ANESTHESIA EA ADD 15 MINS: Performed by: SURGERY

## 2025-07-07 PROCEDURE — 85025 COMPLETE CBC W/AUTO DIFF WBC: CPT | Performed by: STUDENT IN AN ORGANIZED HEALTH CARE EDUCATION/TRAINING PROGRAM

## 2025-07-07 PROCEDURE — 36000707: Performed by: SURGERY

## 2025-07-07 PROCEDURE — 83690 ASSAY OF LIPASE: CPT | Performed by: STUDENT IN AN ORGANIZED HEALTH CARE EDUCATION/TRAINING PROGRAM

## 2025-07-07 PROCEDURE — 88302 TISSUE EXAM BY PATHOLOGIST: CPT | Performed by: SURGERY

## 2025-07-07 PROCEDURE — 80053 COMPREHEN METABOLIC PANEL: CPT | Performed by: STUDENT IN AN ORGANIZED HEALTH CARE EDUCATION/TRAINING PROGRAM

## 2025-07-07 PROCEDURE — 85610 PROTHROMBIN TIME: CPT | Performed by: STUDENT IN AN ORGANIZED HEALTH CARE EDUCATION/TRAINING PROGRAM

## 2025-07-07 PROCEDURE — 99285 EMERGENCY DEPT VISIT HI MDM: CPT | Mod: 25

## 2025-07-07 PROCEDURE — 0WQF0ZZ REPAIR ABDOMINAL WALL, OPEN APPROACH: ICD-10-PCS | Performed by: EMERGENCY MEDICINE

## 2025-07-07 PROCEDURE — 96374 THER/PROPH/DIAG INJ IV PUSH: CPT

## 2025-07-07 PROCEDURE — 37000008 HC ANESTHESIA 1ST 15 MINUTES: Performed by: SURGERY

## 2025-07-07 PROCEDURE — C1729 CATH, DRAINAGE: HCPCS | Performed by: SURGERY

## 2025-07-07 PROCEDURE — 71000039 HC RECOVERY, EACH ADD'L HOUR: Performed by: SURGERY

## 2025-07-07 RX ORDER — ACETAMINOPHEN 10 MG/ML
INJECTION, SOLUTION INTRAVENOUS
Status: DISCONTINUED | OUTPATIENT
Start: 2025-07-07 | End: 2025-07-07

## 2025-07-07 RX ORDER — GABAPENTIN 800 MG/1
800 TABLET ORAL 3 TIMES DAILY
Status: ON HOLD | COMMUNITY
End: 2025-07-11 | Stop reason: HOSPADM

## 2025-07-07 RX ORDER — HYDROMORPHONE HYDROCHLORIDE 2 MG/ML
0.2 INJECTION, SOLUTION INTRAMUSCULAR; INTRAVENOUS; SUBCUTANEOUS EVERY 5 MIN PRN
Status: DISCONTINUED | OUTPATIENT
Start: 2025-07-07 | End: 2025-07-07 | Stop reason: HOSPADM

## 2025-07-07 RX ORDER — HYDROMORPHONE HYDROCHLORIDE 2 MG/ML
0.5 INJECTION, SOLUTION INTRAMUSCULAR; INTRAVENOUS; SUBCUTANEOUS EVERY 5 MIN PRN
Status: DISCONTINUED | OUTPATIENT
Start: 2025-07-07 | End: 2025-07-07 | Stop reason: HOSPADM

## 2025-07-07 RX ORDER — ACETAMINOPHEN 325 MG/1
650 TABLET ORAL EVERY 4 HOURS PRN
Status: DISCONTINUED | OUTPATIENT
Start: 2025-07-07 | End: 2025-07-07

## 2025-07-07 RX ORDER — HYDROCODONE BITARTRATE AND ACETAMINOPHEN 5; 325 MG/1; MG/1
1 TABLET ORAL EVERY 8 HOURS PRN
Qty: 15 TABLET | Refills: 0 | Status: SHIPPED | OUTPATIENT
Start: 2025-07-07 | End: 2025-07-11

## 2025-07-07 RX ORDER — DEXMEDETOMIDINE HYDROCHLORIDE 100 UG/ML
INJECTION, SOLUTION INTRAVENOUS
Status: DISCONTINUED | OUTPATIENT
Start: 2025-07-07 | End: 2025-07-07

## 2025-07-07 RX ORDER — CEFAZOLIN SODIUM 1 G/3ML
INJECTION, POWDER, FOR SOLUTION INTRAMUSCULAR; INTRAVENOUS
Status: DISCONTINUED | OUTPATIENT
Start: 2025-07-07 | End: 2025-07-07

## 2025-07-07 RX ORDER — MORPHINE SULFATE 4 MG/ML
1 INJECTION, SOLUTION INTRAMUSCULAR; INTRAVENOUS EVERY 4 HOURS PRN
Refills: 0 | Status: DISCONTINUED | OUTPATIENT
Start: 2025-07-07 | End: 2025-07-08

## 2025-07-07 RX ORDER — KETOROLAC TROMETHAMINE 30 MG/ML
15 INJECTION, SOLUTION INTRAMUSCULAR; INTRAVENOUS
Status: COMPLETED | OUTPATIENT
Start: 2025-07-07 | End: 2025-07-07

## 2025-07-07 RX ORDER — GLYCOPYRROLATE 0.2 MG/ML
INJECTION INTRAMUSCULAR; INTRAVENOUS
Status: DISCONTINUED | OUTPATIENT
Start: 2025-07-07 | End: 2025-07-07

## 2025-07-07 RX ORDER — ONDANSETRON HYDROCHLORIDE 2 MG/ML
INJECTION, SOLUTION INTRAMUSCULAR; INTRAVENOUS
Status: DISCONTINUED | OUTPATIENT
Start: 2025-07-07 | End: 2025-07-07

## 2025-07-07 RX ORDER — ACETAMINOPHEN 325 MG/1
650 TABLET ORAL EVERY 8 HOURS PRN
Status: DISCONTINUED | OUTPATIENT
Start: 2025-07-07 | End: 2025-07-07

## 2025-07-07 RX ORDER — FENTANYL CITRATE 50 UG/ML
INJECTION, SOLUTION INTRAMUSCULAR; INTRAVENOUS
Status: DISCONTINUED | OUTPATIENT
Start: 2025-07-07 | End: 2025-07-07

## 2025-07-07 RX ORDER — SUCCINYLCHOLINE CHLORIDE 20 MG/ML
INJECTION INTRAMUSCULAR; INTRAVENOUS
Status: DISCONTINUED | OUTPATIENT
Start: 2025-07-07 | End: 2025-07-07

## 2025-07-07 RX ORDER — ONDANSETRON 4 MG/1
4 TABLET, ORALLY DISINTEGRATING ORAL EVERY 8 HOURS PRN
Qty: 15 TABLET | Refills: 0 | Status: SHIPPED | OUTPATIENT
Start: 2025-07-07 | End: 2025-07-11 | Stop reason: HOSPADM

## 2025-07-07 RX ORDER — METHOCARBAMOL 100 MG/ML
1000 INJECTION, SOLUTION INTRAMUSCULAR; INTRAVENOUS EVERY 8 HOURS
Status: DISPENSED | OUTPATIENT
Start: 2025-07-07 | End: 2025-07-10

## 2025-07-07 RX ORDER — MORPHINE SULFATE 4 MG/ML
2 INJECTION, SOLUTION INTRAMUSCULAR; INTRAVENOUS EVERY 4 HOURS PRN
Status: DISCONTINUED | OUTPATIENT
Start: 2025-07-07 | End: 2025-07-08

## 2025-07-07 RX ORDER — ENOXAPARIN SODIUM 100 MG/ML
40 INJECTION SUBCUTANEOUS EVERY 12 HOURS
Status: DISCONTINUED | OUTPATIENT
Start: 2025-07-07 | End: 2025-07-09

## 2025-07-07 RX ORDER — DEXAMETHASONE SODIUM PHOSPHATE 4 MG/ML
INJECTION, SOLUTION INTRA-ARTICULAR; INTRALESIONAL; INTRAMUSCULAR; INTRAVENOUS; SOFT TISSUE
Status: DISCONTINUED | OUTPATIENT
Start: 2025-07-07 | End: 2025-07-07

## 2025-07-07 RX ORDER — PROPOFOL 10 MG/ML
VIAL (ML) INTRAVENOUS
Status: DISCONTINUED | OUTPATIENT
Start: 2025-07-07 | End: 2025-07-07

## 2025-07-07 RX ORDER — GLUCAGON 1 MG
1 KIT INJECTION
Status: DISCONTINUED | OUTPATIENT
Start: 2025-07-07 | End: 2025-07-07 | Stop reason: HOSPADM

## 2025-07-07 RX ORDER — ONDANSETRON HYDROCHLORIDE 2 MG/ML
4 INJECTION, SOLUTION INTRAVENOUS EVERY 4 HOURS PRN
Status: DISCONTINUED | OUTPATIENT
Start: 2025-07-07 | End: 2025-07-10

## 2025-07-07 RX ORDER — ONDANSETRON 4 MG/1
8 TABLET, ORALLY DISINTEGRATING ORAL EVERY 8 HOURS PRN
Status: DISCONTINUED | OUTPATIENT
Start: 2025-07-07 | End: 2025-07-07

## 2025-07-07 RX ORDER — MIDAZOLAM HYDROCHLORIDE 1 MG/ML
INJECTION INTRAMUSCULAR; INTRAVENOUS
Status: DISCONTINUED | OUTPATIENT
Start: 2025-07-07 | End: 2025-07-07

## 2025-07-07 RX ORDER — MORPHINE SULFATE 4 MG/ML
4 INJECTION, SOLUTION INTRAMUSCULAR; INTRAVENOUS
Refills: 0 | Status: COMPLETED | OUTPATIENT
Start: 2025-07-07 | End: 2025-07-07

## 2025-07-07 RX ORDER — SODIUM CHLORIDE 0.9 % (FLUSH) 0.9 %
10 SYRINGE (ML) INJECTION
Status: DISCONTINUED | OUTPATIENT
Start: 2025-07-07 | End: 2025-07-11 | Stop reason: HOSPADM

## 2025-07-07 RX ORDER — TALC
6 POWDER (GRAM) TOPICAL NIGHTLY PRN
Status: DISCONTINUED | OUTPATIENT
Start: 2025-07-07 | End: 2025-07-07

## 2025-07-07 RX ORDER — METHOCARBAMOL 100 MG/ML
1000 INJECTION, SOLUTION INTRAMUSCULAR; INTRAVENOUS ONCE
Status: DISCONTINUED | OUTPATIENT
Start: 2025-07-07 | End: 2025-07-07 | Stop reason: HOSPADM

## 2025-07-07 RX ORDER — ONDANSETRON HYDROCHLORIDE 2 MG/ML
4 INJECTION, SOLUTION INTRAVENOUS ONCE
Status: DISCONTINUED | OUTPATIENT
Start: 2025-07-07 | End: 2025-07-07 | Stop reason: HOSPADM

## 2025-07-07 RX ORDER — SODIUM CHLORIDE, SODIUM LACTATE, POTASSIUM CHLORIDE, CALCIUM CHLORIDE 600; 310; 30; 20 MG/100ML; MG/100ML; MG/100ML; MG/100ML
INJECTION, SOLUTION INTRAVENOUS CONTINUOUS
Status: DISCONTINUED | OUTPATIENT
Start: 2025-07-07 | End: 2025-07-11 | Stop reason: HOSPADM

## 2025-07-07 RX ORDER — ONDANSETRON HYDROCHLORIDE 2 MG/ML
4 INJECTION, SOLUTION INTRAVENOUS
Status: COMPLETED | OUTPATIENT
Start: 2025-07-07 | End: 2025-07-07

## 2025-07-07 RX ORDER — ACETAMINOPHEN 10 MG/ML
1000 INJECTION, SOLUTION INTRAVENOUS EVERY 8 HOURS
Status: COMPLETED | OUTPATIENT
Start: 2025-07-07 | End: 2025-07-08

## 2025-07-07 RX ORDER — LIDOCAINE HYDROCHLORIDE 20 MG/ML
INJECTION, SOLUTION EPIDURAL; INFILTRATION; INTRACAUDAL; PERINEURAL
Status: DISCONTINUED | OUTPATIENT
Start: 2025-07-07 | End: 2025-07-07

## 2025-07-07 RX ORDER — DIPHENHYDRAMINE HYDROCHLORIDE 50 MG/ML
25 INJECTION, SOLUTION INTRAMUSCULAR; INTRAVENOUS EVERY 6 HOURS PRN
Status: DISCONTINUED | OUTPATIENT
Start: 2025-07-07 | End: 2025-07-07 | Stop reason: HOSPADM

## 2025-07-07 RX ORDER — ROCURONIUM BROMIDE 10 MG/ML
INJECTION, SOLUTION INTRAVENOUS
Status: DISCONTINUED | OUTPATIENT
Start: 2025-07-07 | End: 2025-07-07

## 2025-07-07 RX ADMIN — HYDROMORPHONE HYDROCHLORIDE 0.5 MG: 2 INJECTION, SOLUTION INTRAMUSCULAR; INTRAVENOUS; SUBCUTANEOUS at 09:07

## 2025-07-07 RX ADMIN — SUCCINYLCHOLINE CHLORIDE 180 MG: 20 INJECTION, SOLUTION INTRAMUSCULAR; INTRAVENOUS at 07:07

## 2025-07-07 RX ADMIN — PROPOFOL 300 MG: 10 INJECTION, EMULSION INTRAVENOUS at 07:07

## 2025-07-07 RX ADMIN — ENOXAPARIN SODIUM 40 MG: 40 INJECTION SUBCUTANEOUS at 08:07

## 2025-07-07 RX ADMIN — DEXMEDETOMIDINE 10 MCG: 200 INJECTION, SOLUTION INTRAVENOUS at 09:07

## 2025-07-07 RX ADMIN — MORPHINE SULFATE 4 MG: 4 INJECTION INTRAVENOUS at 04:07

## 2025-07-07 RX ADMIN — METHOCARBAMOL 1000 MG: 100 INJECTION, SOLUTION INTRAMUSCULAR; INTRAVENOUS at 09:07

## 2025-07-07 RX ADMIN — ONDANSETRON 4 MG: 2 INJECTION INTRAMUSCULAR; INTRAVENOUS at 08:07

## 2025-07-07 RX ADMIN — MORPHINE SULFATE 2 MG: 4 INJECTION, SOLUTION INTRAMUSCULAR; INTRAVENOUS at 08:07

## 2025-07-07 RX ADMIN — METHOCARBAMOL 1000 MG: 100 INJECTION INTRAMUSCULAR; INTRAVENOUS at 06:07

## 2025-07-07 RX ADMIN — PROPOFOL 50 MG: 10 INJECTION, EMULSION INTRAVENOUS at 09:07

## 2025-07-07 RX ADMIN — SODIUM CHLORIDE 1000 ML: 9 INJECTION, SOLUTION INTRAVENOUS at 03:07

## 2025-07-07 RX ADMIN — ENOXAPARIN SODIUM 40 MG: 40 INJECTION SUBCUTANEOUS at 01:07

## 2025-07-07 RX ADMIN — DEXAMETHASONE SODIUM PHOSPHATE 8 MG: 4 INJECTION, SOLUTION INTRA-ARTICULAR; INTRALESIONAL; INTRAMUSCULAR; INTRAVENOUS; SOFT TISSUE at 07:07

## 2025-07-07 RX ADMIN — ACETAMINOPHEN 1000 MG: 10 INJECTION, SOLUTION INTRAVENOUS at 01:07

## 2025-07-07 RX ADMIN — SODIUM CHLORIDE, SODIUM GLUCONATE, SODIUM ACETATE, POTASSIUM CHLORIDE AND MAGNESIUM CHLORIDE: 526; 502; 368; 37; 30 INJECTION, SOLUTION INTRAVENOUS at 07:07

## 2025-07-07 RX ADMIN — CEFAZOLIN 3 G: 330 INJECTION, POWDER, FOR SOLUTION INTRAMUSCULAR; INTRAVENOUS at 07:07

## 2025-07-07 RX ADMIN — MIDAZOLAM HYDROCHLORIDE 2 MG: 1 INJECTION, SOLUTION INTRAMUSCULAR; INTRAVENOUS at 07:07

## 2025-07-07 RX ADMIN — FENTANYL CITRATE 100 MCG: 50 INJECTION, SOLUTION INTRAMUSCULAR; INTRAVENOUS at 07:07

## 2025-07-07 RX ADMIN — SUGAMMADEX 200 MG: 100 INJECTION, SOLUTION INTRAVENOUS at 08:07

## 2025-07-07 RX ADMIN — ACETAMINOPHEN 1000 MG: 10 INJECTION, SOLUTION INTRAVENOUS at 08:07

## 2025-07-07 RX ADMIN — ONDANSETRON 4 MG: 2 INJECTION INTRAMUSCULAR; INTRAVENOUS at 03:07

## 2025-07-07 RX ADMIN — IOHEXOL 100 ML: 350 INJECTION, SOLUTION INTRAVENOUS at 03:07

## 2025-07-07 RX ADMIN — ROCURONIUM BROMIDE 20 MG: 10 SOLUTION INTRAVENOUS at 07:07

## 2025-07-07 RX ADMIN — LIDOCAINE HYDROCHLORIDE 80 MG: 20 INJECTION, SOLUTION EPIDURAL; INFILTRATION; INTRACAUDAL; PERINEURAL at 07:07

## 2025-07-07 RX ADMIN — DEXMEDETOMIDINE 10 MCG: 200 INJECTION, SOLUTION INTRAVENOUS at 08:07

## 2025-07-07 RX ADMIN — SODIUM CHLORIDE, POTASSIUM CHLORIDE, SODIUM LACTATE AND CALCIUM CHLORIDE: 600; 310; 30; 20 INJECTION, SOLUTION INTRAVENOUS at 08:07

## 2025-07-07 RX ADMIN — PROPOFOL 20 MG: 10 INJECTION, EMULSION INTRAVENOUS at 08:07

## 2025-07-07 RX ADMIN — SODIUM CHLORIDE: 9 INJECTION, SOLUTION INTRAVENOUS at 07:07

## 2025-07-07 RX ADMIN — SODIUM CHLORIDE, POTASSIUM CHLORIDE, SODIUM LACTATE AND CALCIUM CHLORIDE 1000 ML: 600; 310; 30; 20 INJECTION, SOLUTION INTRAVENOUS at 04:07

## 2025-07-07 RX ADMIN — SODIUM CHLORIDE, POTASSIUM CHLORIDE, SODIUM LACTATE AND CALCIUM CHLORIDE: 600; 310; 30; 20 INJECTION, SOLUTION INTRAVENOUS at 12:07

## 2025-07-07 RX ADMIN — MORPHINE SULFATE 2 MG: 4 INJECTION, SOLUTION INTRAMUSCULAR; INTRAVENOUS at 03:07

## 2025-07-07 RX ADMIN — GLYCOPYRROLATE 0.2 MG: 0.2 INJECTION INTRAMUSCULAR; INTRAVENOUS at 07:07

## 2025-07-07 RX ADMIN — KETOROLAC TROMETHAMINE 15 MG: 30 INJECTION, SOLUTION INTRAMUSCULAR; INTRAVENOUS at 03:07

## 2025-07-07 NOTE — ANESTHESIA PROCEDURE NOTES
Peripheral IV Insertion    Diagnosis: I87.9 Disorder of vein, unspecified.    Patient location during procedure: OR    Staffing  Authorizing Provider: Stefanie Cloud MD  Performing Provider: Stefanie Cloud MD    Staffing  Performed by: Stefanie Cloud MD  Authorized by: Stefanie Cloud MD    Anesthesiologist was present at the time of the procedure.    Preanesthetic Checklist  Completed: patient identified, IV checked, site marked, risks and benefits discussed, surgical consent, monitors and equipment checked, pre-op evaluation, timeout performed and anesthesia consent givenPeripheral IV Insertion  Skin Prep: alcohol swabs  Orientation: left  Location: antecubital  Catheter Type: peripheral IV (single lumen)  Catheter Size: 18 G  Catheter placement by Ultrasound guidance. Heme positive aspiration all ports.   Vessel Caliber: medium, patent, compressibility normal  Vascular Doppler:  not done  Needle advanced into vessel with real time Ultrasound guidance.  Image recorded and saved.Insertion Attempts: 1  Assessment  Dressing: secured with tape and tegaderm  Patient: Tolerated well  Line flushed easily.

## 2025-07-07 NOTE — ANESTHESIA PREPROCEDURE EVALUATION
07/07/2025  Romario Smith is a 34 y.o., male admitted w/ abd pain, N/V    .  Lab Results   Component Value Date    WBC 7.50 07/07/2025    HGB 15.3 07/07/2025    HCT 49.9 07/07/2025    MCV 85.6 07/07/2025     07/07/2025          BMP  Lab Results   Component Value Date     07/07/2025    K 3.5 07/07/2025     07/07/2025    CO2 32 (H) 07/07/2025    BUN 17.1 07/07/2025    CREATININE 0.84 07/07/2025    CALCIUM 9.9 07/07/2025    EGFRNONAA >105 05/22/2020        CT:    Midline ventral hernia containing transverse colon and small bowel with resultant small bowel obstruction. There is no evidence of ischemia or perforation. There is a small amount of free fluid in the herniating sac with associated mesenteric inflammatory stranding consistent with incarceration and resultant obstruction.        Pre-op Assessment    I have reviewed the Patient Summary Reports.     I have reviewed the Nursing Notes. I have reviewed the NPO Status.   I have reviewed the Medications.     Review of Systems         Anesthesia Plan  Type of Anesthesia, risks & benefits discussed:    Anesthesia Type: Gen ETT  Intra-op Monitoring Plan: Standard ASA Monitors  Post Op Pain Control Plan: multimodal analgesia  Induction:  IV and rapid sequence  Airway Plan: Direct, Post-Induction  Informed Consent: Informed consent signed with the Patient and all parties understand the risks and agree with anesthesia plan.  All questions answered. Patient consented to blood products? Yes  ASA Score: 2 Emergent  Day of Surgery Review of History & Physical: H&P Update referred to the surgeon/provider.    Ready For Surgery From Anesthesia Perspective.     .

## 2025-07-07 NOTE — PROGRESS NOTES
"0910 - clarified with residents that patient is supposed to have an NG tube but he pulled out at the end of case; "Place another one because we messed with his bowels and he will need one."    0915 - I attempted in bilateral nostrils to place NG; unsuccessful and patient in pain; went back to resident office to make them aware; told by Stephanie PINEDA that he will not need an NG and to keep him NPO  "

## 2025-07-07 NOTE — ED PROVIDER NOTES
Encounter Date: 7/7/2025    SCRIBE #1 NOTE: I, Amanda La, am scribing for, and in the presence of,  Kahlil Chaudhary MD. I have scribed the following portions of the note - Other sections scribed: HPI, ROS, PE.       History     Chief Complaint   Patient presents with    Hernia     PT reports new hernia to middle of abdomen above umbilicus, started hurting with N/V yesterday.      Patient is a 35 yo male presents to ED complaining of abdominal pain onset last night. Pt reports the pain was accompanied with nausea and vomiting and that he has a bulge above his umbilicus. Pt denies any issues with bowel movements, chest pain, back pain, SOB, and has no other complaints at this time.    The history is provided by the patient. No  was used.     Review of patient's allergies indicates:  No Known Allergies  No past medical history on file.  No past surgical history on file.  No family history on file.  Social History[1]  Review of Systems   Constitutional:  Negative for fever.   HENT:  Negative for sore throat.    Eyes:  Negative for visual disturbance.   Respiratory:  Negative for shortness of breath.    Cardiovascular:  Negative for chest pain.   Gastrointestinal:  Positive for abdominal pain, nausea and vomiting.   Genitourinary:  Negative for dysuria.   Musculoskeletal:  Negative for joint swelling.   Skin:  Negative for rash.   Neurological:  Negative for weakness.   Psychiatric/Behavioral:  Negative for confusion.    All other systems reviewed and are negative.      Physical Exam     Initial Vitals [07/07/25 0124]   BP Pulse Resp Temp SpO2   (!) 137/90 103 16 98.9 °F (37.2 °C) 95 %      MAP       --         Physical Exam    Nursing note and vitals reviewed.  Constitutional: He appears well-developed and well-nourished. He is not diaphoretic. He is Obese . No distress.   HENT:   Head: Normocephalic and atraumatic.   Eyes: Conjunctivae and EOM are normal. Pupils are equal, round, and reactive  to light.   Neck:   Normal range of motion.  Cardiovascular:  Normal rate, regular rhythm, normal heart sounds and intact distal pulses.           No murmur heard.  Pulmonary/Chest: Breath sounds normal. No respiratory distress. He has no wheezes. He has no rales.   Abdominal: Abdomen is soft. He exhibits no distension. There is abdominal tenderness.   Large ventral wall hernia.  Unable to reduce.   Musculoskeletal:         General: No tenderness or edema. Normal range of motion.      Cervical back: Normal range of motion.     Neurological: He is alert and oriented to person, place, and time. No cranial nerve deficit.   Skin: Skin is warm and dry. Capillary refill takes less than 2 seconds. No rash noted. No erythema.   Psychiatric: He has a normal mood and affect.         ED Course   Procedures  Labs Reviewed   COMPREHENSIVE METABOLIC PANEL - Abnormal       Result Value    Sodium 143      Potassium 3.5      Chloride 101      CO2 32 (*)     Glucose 112 (*)     Blood Urea Nitrogen 17.1      Creatinine 0.84      Calcium 9.9      Protein Total 8.4 (*)     Albumin 3.7      Globulin 4.7 (*)     Albumin/Globulin Ratio 0.8 (*)     Bilirubin Total 0.5      ALP 81      ALT 16      AST 18      eGFR >60      Anion Gap 10.0      BUN/Creatinine Ratio 20     CBC WITH DIFFERENTIAL - Abnormal    WBC 7.50      RBC 5.83      Hgb 15.3      Hct 49.9      MCV 85.6      MCH 26.2 (*)     MCHC 30.7 (*)     RDW 13.2      Platelet 321      MPV 11.1 (*)     Neut % 67.8      Lymph % 19.5      Mono % 11.1      Eos % 1.1      Basophil % 0.4      Imm Grans % 0.1      Neut # 5.09      Lymph # 1.46      Mono # 0.83      Eos # 0.08      Baso # 0.03      Imm Gran # 0.01      NRBC% 0.0     LIPASE - Normal    Lipase Level 15     CBC W/ AUTO DIFFERENTIAL    Narrative:     The following orders were created for panel order CBC auto differential.  Procedure                               Abnormality         Status                     ---------                                -----------         ------                     CBC with Differential[[id number]]       Abnormal            Final result                 Please view results for these tests on the individual orders.   PROTIME-INR   APTT   LACTIC ACID, PLASMA          Imaging Results              CT Abdomen Pelvis With IV Contrast NO Oral Contrast (Final result)  Result time 07/07/25 04:20:34      Final result by All Simmons MD (07/07/25 04:20:34)                   Impression:      Midline ventral hernia containing transverse colon and small bowel with resultant small bowel obstruction.  There is no evidence of ischemia or perforation.  There is a small amount of free fluid in the herniating sac with associated mesenteric inflammatory stranding consistent with incarceration and resultant obstruction.    [employer] concordance      Electronically signed by: All Simmons MD  Date:    07/07/2025  Time:    04:20               Narrative:    EXAMINATION:  CT ABDOMEN PELVIS WITH IV CONTRAST    CLINICAL HISTORY:  Abdominal pain, acute, nonlocalized;hernia;    TECHNIQUE:  Axial images of the abdomen and pelvis were obtained with IV contrast administration.  Coronal and sagittal reconstructions were provided.  Three dimensional and MIP images were obtained and evaluated.  Total DLP was 2477 mGy-cm. Dose lowering technique and automated exposure control were utilized for this exam.    COMPARISON:  None    FINDINGS:  The bilateral lung bases are clear.  The heart is normal in size.    There is diffuse fatty infiltration of the liver.  The portal vein is patent.  The gallbladder, spleen, pancreas and adrenal glands are normal.  The bilateral kidneys are normal.  There is no hydronephrosis or nephrolithiasis.    There is a midline ventral hernia containing transverse colon and multiple loops of small bowel.  The small bowel throughout the peritoneum is distended and fluid-filled.  There is a small amount of fluid within the  herniating sac.  There is mesenteric inflammatory stranding.  There is no pneumatosis.  There is no extraluminal air.  There is no portal venous gas.  The distal colon is decompressed.    The urinary bladder is normal.  There is no pelvic or retroperitoneal adenopathy.  The aorta is nonaneurysmal.  There is no lytic or blastic osseous lesion.                        Preliminary result by Abdulkadir Duarte MD (07/07/25 04:05:40)                   Impression:    1. There is an approximately 7 cm wide midline ventral umbilical hernia centered on series 3 image 83. A loop of transverse colon is seen herniating into the sac, however, no proximal colon dilatation to suggest obstruction. Multiple small bowel loops with associated abdominal fat also seen herniating into the sac centered at series 3 image 84 with pronounced dilatation of the proximal small bowel loops. This is consistent with a mechanical small bowel obstruction related to the entrance of the hernia with the contained small bowel in the hernia in the distal ileum which exits the hernia all decompressed. There is some fluid in the hernia sac without wall thickening of the contain bowel segments which however could reflect an element of vascular compromise. Correlate with clinical and laboratory findings as regards additional evaluation and recommend follow-up to resolution as indicated.  2. Details and other findings as discussed above.               Narrative:    START OF REPORT:  Technique: CT of the abdomen and pelvis was performed with axial images as well as sagittal and coronal reconstruction images with intravenous contrast.    Comparison: None available.    Clinical History: Abd pain, hernia.    Dosage Information: Automated Exposure Control was utilized.    Findings:  Lines and Tubes: None.  Thorax:  Lungs: There is a 4 mm non-specific subpleural nodule noted in the lateral segment of the right middle lobe seen on series 4 image 12. Another  approximately 3.5 mm non-specific subpleural nodule noted in the inferior lingula of the left upper lobe. Bilateral lung bases otherwise do not show any significant abnormality.  Pleura: No effusions or thickening.  Heart: The heart size is within normal limits.  Liver: Mild to moderate fatty infiltration of the liver is present. The liver otherwise appears unremarkable.  Biliary System: No intrahepatic or extrahepatic biliary duct dilatation is seen.  Gallbladder: Moderate dependent hyperdensity is seen in the gallbladder which may represent sludge. The gallbladder otherwise appears unremarkable.  Pancreas: The pancreas appears unremarkable.  Spleen: The spleen appears unremarkable. A small 13.1 mm diameter splenule is seen on Image 50, Series 3.  Adrenals: The adrenal glands appear unremarkable.  Kidneys: The kidneys appear unremarkable with no stones cysts masses or hydronephrosis.  Aorta: The abdominal aorta appears unremarkable.  IVC: Unremarkable.  Bowel:  Esophagus: The visualized esophagus appears unremarkable.  Stomach: The stomach appears unremarkable.  Duodenum: Unremarkable appearing duodenum.  Small Bowel: There is an approximately 7 cm wide midline ventral umbilical hernia centered on series 3 image 83. A loop of transverse colon is seen herniating into the sac, however, no proximal colon dilatation to suggest obstruction. Multiple small bowel loops with associated abdominal fat also seen herniating into the sac centered at series 3 image 84 with pronounced dilatation of the proximal small bowel loops.  Appendix: The appendix appears unremarkable and is partially seen on Image 74, Series 5 through Image 70, Series 5.  Peritoneum: No intraperitoneal free air or ascites is seen.    Pelvis:  Bladder: The bladder appears unremarkable.  Male:  Prostate gland: The prostate gland appears unremarkable.    Bony structures:  Dorsal Spine: The visualized dorsal spine appears unremarkable.  Bony Pelvis: The  visualized bony structures of the pelvis appear unremarkable.    Notifications: The results were discussed with the emergency room physician (Dr. Chaudhary) prior to dictation at 2025-07-07 04:05:15 CDT.                                         Medications   sodium chloride 0.9% bolus 1,000 mL 1,000 mL (1,000 mLs Intravenous New Bag 7/7/25 0346)   lactated ringers bolus 1,000 mL (1,000 mLs Intravenous New Bag 7/7/25 0429)   ondansetron injection 4 mg (4 mg Intravenous Given 7/7/25 0345)   ketorolac injection 15 mg (15 mg Intravenous Given 7/7/25 0345)   iohexoL (OMNIPAQUE 350) injection 100 mL (100 mLs Intravenous Given 7/7/25 0328)   morphine injection 4 mg (4 mg Intravenous Given 7/7/25 0429)     Medical Decision Making  Judging by the patient's chief complaint and pertinent history, the patient has the following possible differential diagnoses, including but not limited to the following.  Some of these are deemed to be lower likelihood and some more likely based on my physical exam and history combined with possible lab work and/or imaging studies.   Please see the pertinent studies, and refer to the HPI.  Some of these diagnoses will take further evaluation to fully rule out, perhaps as an outpatient and the patient was encouraged to follow up when discharged for more comprehensive evaluation.    Hernia, incarcerated hernia, bowel obstruction appendicitis, biliary disease, diverticulitis, mesenteric ischemia, intraabdominal abscess, retroperitoneal abscess, gastritis, gastroenteritis, hepatitis, hernia, pancreatitis, inflammatory bowel disease, PUD, gastroparesis, nephrolithiasis, constipation, GERD, IBS    Patient is a 34-year-old male presents to emergency department for abdominal pain, nausea, vomiting, hernia.  See HPI.  See physical exam.  Imaging obtained and is as noted.  Discussed case with General surgery who will come evaluate the patient.  All results discussed with the patient and family.  Answered all  questions time.  Verbalized understanding agreed to plan.      Problems Addressed:  Abdominal pain, unspecified abdominal location: acute illness or injury that poses a threat to life or bodily functions  Umbilical hernia without obstruction and without gangrene: acute illness or injury that poses a threat to life or bodily functions    Amount and/or Complexity of Data Reviewed  External Data Reviewed: notes.  Labs: ordered. Decision-making details documented in ED Course.  Radiology: ordered. Decision-making details documented in ED Course.  Discussion of management or test interpretation with external provider(s): Discussed case with General surgery who will come evaluate the patient.    Risk  Prescription drug management.  Parenteral controlled substances.  Decision regarding hospitalization.            Scribe Attestation:   Scribe #1: I performed the above scribed service and the documentation accurately describes the services I performed. I attest to the accuracy of the note.    Attending Attestation:           Physician Attestation for Scribe:  Physician Attestation Statement for Scribe #1: I, Kahlil Chaudhary MD, reviewed documentation, as scribed by Amanda La in my presence, and it is both accurate and complete.             ED Course as of 07/07/25 0445   Mon Jul 07, 2025   0420 CT Abdomen Pelvis With IV Contrast NO Oral Contrast [RP]   0435 Discussed case with General surgery who will evaluate the patient. [RP]      ED Course User Index  [RP] Kahlil Chaudhary MD                           Clinical Impression:  Final diagnoses:  [K42.9] Umbilical hernia without obstruction and without gangrene (Primary)  [R10.9] Abdominal pain, unspecified abdominal location          ED Disposition Condition    Observation                       [1]   Social History  Tobacco Use    Smoking status: Every Day     Current packs/day: 0.50     Types: Cigarettes    Smokeless tobacco: Never   Substance Use Topics    Drug use:  Yes     Comment: Kahlil Culver MD  07/07/25 0610

## 2025-07-07 NOTE — ANESTHESIA PROCEDURE NOTES
Intubation    Date/Time: 7/7/2025 7:30 AM    Performed by: Juliet Santillan CRNA  Authorized by: Stefanie Cloud MD    Intubation:     Induction:  Rapid sequence induction    Intubated:  Postinduction    Mask Ventilation:  Easy with oral airway    Attempts:  1    Attempted By:  CRNA and student (BERTHA Webb)    Method of Intubation:  Video laryngoscopy    Blade:  Nash 3    Laryngeal View Grade: Grade I - full view of cords      Difficult Airway Encountered?: No      Complications:  None    Airway Device:  Oral endotracheal tube    Airway Device Size:  7.5    Style/Cuff Inflation:  Cuffed (inflated to minimal occlusive pressure)    Inflation Amount (mL):  6    Tube secured:  22    Secured at:  The teeth    Placement Verified By:  Capnometry    Complicating Factors:  Short neck, obesity and beard    Findings Post-Intubation:  BS equal bilateral and atraumatic/condition of teeth unchanged

## 2025-07-07 NOTE — OP NOTE
[facility] - Periop Services  General Surgery  Operative Note    SUMMARY     Date of Procedure: 7/7/2025     Procedure: Procedure(s) (LRB):  REPAIR, VENTRAL HERNIA - OPEN (N/A)       Surgeons and Role:     * Rony Brown Jr., MD - Primary     * Lou Jerez MD - Resident - Assisting      * Nancy Reed MD - Resident - Assisting        Pre-Operative Diagnosis: Umbilical hernia without obstruction and without gangrene [K42.9]    Post-Operative Diagnosis: Post-Op Diagnosis Codes:     * Umbilical hernia without obstruction and without gangrene [K42.9]    Anesthesia: General    Operative Findings (including complications, if any): Incarcerated ventral hernia measuring [id number]. Bowel pink and viable upon reduction     Description of Technical Procedures:     Patient was taken to the operating room and laid supine on the operating room table. General endotracheal anesthesia was induced without issue. The patient's abdomen was prepped and draped in the usual sterile fashion. A timeout was called which confirmed correct patient and procedure, all in attendance were in agreement.     An upper midline incision was made using a 10 blade. The incision was deepened using electrocautery until the hernia sac was identified. The sac was dissected away from surrounding subcutaneous tissue until the edges of the fascia were able to bee seen circumferentially around the hernia defect. The hernia sac was then entered, the bowel was reduced back into the abdominal cavity, it was pink and viable. No injury to the bowel was observed. The hernia sac was ligated using electrocautery and sent to pathology. The hernia fascial defect was measured at 8cm by 5cm. This was closed primarily with a 0 Prolene suture in a running fashion. The fascia came together easily with no excess tension. A 19 Fr MATT drain was placed into the subcutaneous space where the hernia sac used to be. The subcutaneous tissue dead space was  closed down using a running 3-0 Vicryl stitch and the skin was approximated with staples.     Counts were correct x2 at the end of the case. Dr. Brown was present and scrubbed for the entirety of the case.         Estimated Blood Loss (EBL): 20cc           Implants: * No implants in log *    Specimens:   Specimen (24h ago, onward)       Start     Ordered    07/07/25 0823  Specimen to Pathology General Surgery  RELEASE UPON ORDERING        References:    Click here for ordering Quick Tip   Question:  Release to patient  Answer:  Immediate    07/07/25 0823                            Condition: Good    Disposition: PACU - hemodynamically stable.    Attestation: I was present and scrubbed for the entire procedure.    Lou Jerez MD   [employer] General Surgery PGY4

## 2025-07-07 NOTE — TRANSFER OF CARE
"Anesthesia Transfer of Care Note    Patient: Romario Smith    Procedure(s) Performed: Procedure(s) (LRB):  REPAIR, VENTRAL HERNIA - OPEN (N/A)    Patient location: PACU    Anesthesia Type: general    Transport from OR: Transported from OR on 6-10 L/min O2 by face mask with adequate spontaneous ventilation    Post pain: adequate analgesia    Post assessment: no apparent anesthetic complications    Post vital signs: stable    Level of consciousness: awake, alert and oriented    Nausea/Vomiting: no nausea/vomiting    Complications: none    Transfer of care protocol was followed      Last vitals: Visit Vitals  BP (!) 139/93 (BP Location: Left arm, Patient Position: Lying)   Pulse 91   Temp 37.2 °C (98.9 °F) (Oral)   Resp 20   Ht 5' 10" (1.778 m)   Wt (!) 147.4 kg (325 lb)   SpO2 100%   BMI 46.63 kg/m²     "

## 2025-07-07 NOTE — DISCHARGE INSTRUCTIONS
"[facility] MATT Drain Output Log:      Date: Date: Date: Date: Date: Date: Date: Date: Date: Date: Comments:   Drain #1 AM               PM               Daily total              Drain #2 AM               PM               Daily total              Drain #3 AM               PM               Daily total                Please bring this log to your next Acute Care Surgery Clinic appointment.    MATT drain instructions:  leave IR drain to FULL suction. If not holding suction, minimal output, or drain becomes dislodged call our office.  Discharging nurse to review with patient:   How to care for the drain   How often to empty the drain   How to measure the fluid   What to do if the drain comes out, does not put out fluid, or puts out too much fluid  How do I empty the drain? --   To empty the drain:   Wash your hands before and after touching the drain or the skin around the wound.   Remove the stopper on the container. Pour out the fluid. If your doctor or nurse asked you to measure the amount of fluid, pour it into a measuring container and write down the amount along with the date and time. You can also note the color of the fluid and whether it has a smell.   Squeeze the container with the stopper open to get the air out. Put the stopper back in while keeping the container squeezed. With the stopper closed, the container should look collapsed or compressed. This means that it is working to pull fluid out of your body.   If the tube is blocked, your doctor or nurse might tell you to "milk" or "strip" the tube to clear it out. Be careful not to pull the drain out. Use 1 hand to pinch the tube near where it comes out of your body. While keeping that hand in place, use your other hand to pinch and move your fingers all of the way to the container before letting go. This will push any blockages out of the tube and into the container, while also making sure that you don't pull on the drain.   After you measure the drained fluid " "and record the amount, flush it down the toilet.  Why do I need to track my drainage? --   When you go home with a drain in place, your doctor might want you to keep track of how much fluid comes out of it. This will help the doctor know when the drain can be removed.  How do I keep track of my drainage? --   When you empty your drain, pour the fluid into a measuring cup or container. Write down the amount along with the date and time.  Your doctor will tell you how to share this information with them. They will also tell you if you should keep track of anything else, like the color of the fluid or whether it has a smell. Some doctors might have you put the information into an online "portal." Or they might ask you to share it by email or phone.  Some people prefer to use a paper chart to keep track of the drainage (table 1). You can print out this chart and write down the amount of drainage each time you empty the drain. Bring the chart with you when you see your doctor.  What else should I know?    Keep the skin where the tube comes out clean. Your doctor or nurse will show you how to change the bandage if needed.   Avoid bumping the drain. Sleep on the opposite side. Depending on where the drain is, you might need to wear a special wrap or piece of clothing that helps hold it in place.   Avoid  the tube from the drainage collection container.   Keep the container compressed at all times, except when emptying.   Follow all instructions about activity after surgery.   Follow all instructions about bathing or showering.  Your doctor or nurse will tell you when the drain can come out. This is usually done when the fluid collected by the drain gets below a certain amount and is of a healthy color and thickness. Drains are removed in the clinic.  When should I call the doctor? --   Call if:   You have any signs of infection, including:   Fever of 100.4°F (38°C) or higher, or chills   Pain, swelling, warmth, " or redness of the skin where the tube comes out   A part of the tube that used to be under the skin comes out of your body.   The container does not stay collapsed or compressed.   The tube gets a crack in it or separates from the container for a long time.   The tube is blocked, and you cannot clear it.   There is a lot of fluid leaking from around the drain.   The amount of fluid in the drain increases a lot or stops suddenly.   The fluid in the drain changes in color (for example, looks green or brown), thickness, or smell.   You have any new or worrying symptoms.    Keep a log of daily output amount and color.   Bring daily log to next Acute Care Surgery clinic appointment

## 2025-07-07 NOTE — H&P
Acute Care Surgery   History and Physical     Patient Name: Romario Smith  YOB: 1990  Date: 07/07/2025 4:46 AM  Date of Admission: 7/7/2025  Room#Exam 22/22   HD#0  POD#* Day of Surgery *    PRESENTING HISTORY   Chief Complaint/Reason for Admission: Ventral hernia with obstruction  History source(s): patient  History of Present Illness:  Romario Smith is a 34 year old male without significant PMHx who presented to [facility] 7/7 with abdominal pain for 2 days. General surgery consulted to evaluate ventral hernia.     Patient states that he initially noticed a bulge to his midline a few months ago,it has remained soft until 7/4. Reports the bulge got more prominent and endorsed associated nausea, vomiting, inability to pass gas or have BM. Pain continued to worsen ultimately prompting the patient to seek evaluation in the ED. No prior abdominal surgical history reported.    Initial work-up significant for midline umbilical ventral hernia with evidence of transverse colon and small intestine through the defect with resulting small bowel obstruction. WBC WNL. Lactic acid WNL. VSS. AF. Ventral hernia soft, but non reducible.     Review of Systems:  12 point ROS negative except as stated in HPI    PAST HISTORY:   Past medical history:  No past medical history on file.    Past surgical history:  No past surgical history on file.    Family history:  No family history on file.    Social history:  Social History     Socioeconomic History    Marital status: Single   Tobacco Use    Smoking status: Every Day     Current packs/day: 0.50     Types: Cigarettes    Smokeless tobacco: Never   Substance and Sexual Activity    Drug use: Yes     Comment: Heroin     Tobacco Use History[1]   Social History     Substance and Sexual Activity   Alcohol Use None        MEDICATIONS & ALLERGIES:     No current facility-administered medications on file prior to encounter.     Current Outpatient Medications on File Prior to  "Encounter   Medication Sig    acetaminophen (TYLENOL) 500 MG tablet Take 2 tablets (1,000 mg total) by mouth every 6 (six) hours as needed.    ARIPiprazole (ABILIFY) 5 MG Tab Take 5 mg by mouth.    ibuprofen (ADVIL,MOTRIN) 600 MG tablet Take 1 tablet (600 mg total) by mouth every 6 (six) hours as needed for Pain.     Allergies: Review of patient's allergies indicates:  No Known Allergies  Scheduled Meds:   lactated ringers  1,000 mL Intravenous ED 1 Time     Continuous Infusions:  PRN Meds:    OBJECTIVE:   Vital Signs:  VITAL SIGNS: 24 HR MIN & MAX LAST   Temp  Min: 98.9 °F (37.2 °C)  Max: 98.9 °F (37.2 °C)  98.9 °F (37.2 °C)   BP  Min: 122/85  Max: 137/90  122/85    Pulse  Min: 92  Max: 103  92    Resp  Min: 14  Max: 19  14    SpO2  Min: 95 %  Max: 95 %  95 %      HT: 5' 10" (177.8 cm)  WT: (!) 147.4 kg (325 lb)  BMI: 46.6     Intake/output:  Intake/Output - Last 3 Shifts       None          No intake or output data in the 24 hours ending 07/07/25 0446      Physical Exam:  General: No acute distress, obese - BMI 46   HEENT: Normocephalic, PERRL  CV: RR  Resp: NWOB  GI:  Abdomen soft - large umbilical hernia, non-reducible. No obvious skin changes. Tender to palpation along the hernia area. No guarding or rebound tenderness.   :  Deferred  MSK: No muscle atrophy, cyanosis, peripheral edema, moving all extremities spontaneously  Skin/wounds:  No rashes, ulcers, erythema  Neuro:  CNII-XII grossly intact, alert and oriented to person, place, and time    Labs:  Troponin:  No results for input(s): "TROPONINI" in the last 72 hours.  CBC:  Recent Labs     07/07/25  0247   WBC 7.50   RBC 5.83   HGB 15.3   HCT 49.9      MCV 85.6   MCH 26.2*   MCHC 30.7*     CMP:  Recent Labs     07/07/25  0247   *   CALCIUM 9.9   ALBUMIN 3.7   PROT 8.4*      K 3.5   CO2 32*      BUN 17.1   CREATININE 0.84   ALKPHOS 81   ALT 16   AST 18   BILITOT 0.5     Lactic Acid:  No results for input(s): "LACTATE" in the last " "72 hours.  ETOH:  No results for input(s): "ETHANOL" in the last 72 hours.   Urine Drug Screen:  No results for input(s): "COCAINE", "OPIATE", "BARBITURATE", "AMPHETAMINE", "FENTANYL", "CANNABINOIDS", "MDMA" in the last 72 hours.    Invalid input(s): "BENZODIAZEPINE", "PHENCYCLIDINE"   ABG:  No results for input(s): "PH", "PO2", "PCO2", "HCO3", "BE" in the last 168 hours.   I have reviewed all pertinent lab results within the past 24 hours.    Diagnostic Results:  Imaging Results              CT Abdomen Pelvis With IV Contrast NO Oral Contrast (Final result)  Result time 07/07/25 04:20:34      Final result by All Simmons MD (07/07/25 04:20:34)                   Impression:      Midline ventral hernia containing transverse colon and small bowel with resultant small bowel obstruction.  There is no evidence of ischemia or perforation.  There is a small amount of free fluid in the herniating sac with associated mesenteric inflammatory stranding consistent with incarceration and resultant obstruction.    [employer] concordance      Electronically signed by: All Simmons MD  Date:    07/07/2025  Time:    04:20               Narrative:    EXAMINATION:  CT ABDOMEN PELVIS WITH IV CONTRAST    CLINICAL HISTORY:  Abdominal pain, acute, nonlocalized;hernia;    TECHNIQUE:  Axial images of the abdomen and pelvis were obtained with IV contrast administration.  Coronal and sagittal reconstructions were provided.  Three dimensional and MIP images were obtained and evaluated.  Total DLP was 2477 mGy-cm. Dose lowering technique and automated exposure control were utilized for this exam.    COMPARISON:  None    FINDINGS:  The bilateral lung bases are clear.  The heart is normal in size.    There is diffuse fatty infiltration of the liver.  The portal vein is patent.  The gallbladder, spleen, pancreas and adrenal glands are normal.  The bilateral kidneys are normal.  There is no hydronephrosis or nephrolithiasis.    There is a midline " ventral hernia containing transverse colon and multiple loops of small bowel.  The small bowel throughout the peritoneum is distended and fluid-filled.  There is a small amount of fluid within the herniating sac.  There is mesenteric inflammatory stranding.  There is no pneumatosis.  There is no extraluminal air.  There is no portal venous gas.  The distal colon is decompressed.    The urinary bladder is normal.  There is no pelvic or retroperitoneal adenopathy.  The aorta is nonaneurysmal.  There is no lytic or blastic osseous lesion.                        Preliminary result by Abdulkadir Duarte MD (07/07/25 04:05:40)                   Impression:    1. There is an approximately 7 cm wide midline ventral umbilical hernia centered on series 3 image 83. A loop of transverse colon is seen herniating into the sac, however, no proximal colon dilatation to suggest obstruction. Multiple small bowel loops with associated abdominal fat also seen herniating into the sac centered at series 3 image 84 with pronounced dilatation of the proximal small bowel loops. This is consistent with a mechanical small bowel obstruction related to the entrance of the hernia with the contained small bowel in the hernia in the distal ileum which exits the hernia all decompressed. There is some fluid in the hernia sac without wall thickening of the contain bowel segments which however could reflect an element of vascular compromise. Correlate with clinical and laboratory findings as regards additional evaluation and recommend follow-up to resolution as indicated.  2. Details and other findings as discussed above.               Narrative:    START OF REPORT:  Technique: CT of the abdomen and pelvis was performed with axial images as well as sagittal and coronal reconstruction images with intravenous contrast.    Comparison: None available.    Clinical History: Abd pain, hernia.    Dosage Information: Automated Exposure Control was  utilized.    Findings:  Lines and Tubes: None.  Thorax:  Lungs: There is a 4 mm non-specific subpleural nodule noted in the lateral segment of the right middle lobe seen on series 4 image 12. Another approximately 3.5 mm non-specific subpleural nodule noted in the inferior lingula of the left upper lobe. Bilateral lung bases otherwise do not show any significant abnormality.  Pleura: No effusions or thickening.  Heart: The heart size is within normal limits.  Liver: Mild to moderate fatty infiltration of the liver is present. The liver otherwise appears unremarkable.  Biliary System: No intrahepatic or extrahepatic biliary duct dilatation is seen.  Gallbladder: Moderate dependent hyperdensity is seen in the gallbladder which may represent sludge. The gallbladder otherwise appears unremarkable.  Pancreas: The pancreas appears unremarkable.  Spleen: The spleen appears unremarkable. A small 13.1 mm diameter splenule is seen on Image 50, Series 3.  Adrenals: The adrenal glands appear unremarkable.  Kidneys: The kidneys appear unremarkable with no stones cysts masses or hydronephrosis.  Aorta: The abdominal aorta appears unremarkable.  IVC: Unremarkable.  Bowel:  Esophagus: The visualized esophagus appears unremarkable.  Stomach: The stomach appears unremarkable.  Duodenum: Unremarkable appearing duodenum.  Small Bowel: There is an approximately 7 cm wide midline ventral umbilical hernia centered on series 3 image 83. A loop of transverse colon is seen herniating into the sac, however, no proximal colon dilatation to suggest obstruction. Multiple small bowel loops with associated abdominal fat also seen herniating into the sac centered at series 3 image 84 with pronounced dilatation of the proximal small bowel loops.  Appendix: The appendix appears unremarkable and is partially seen on Image 74, Series 5 through Image 70, Series 5.  Peritoneum: No intraperitoneal free air or ascites is seen.    Pelvis:  Bladder: The  bladder appears unremarkable.  Male:  Prostate gland: The prostate gland appears unremarkable.    Bony structures:  Dorsal Spine: The visualized dorsal spine appears unremarkable.  Bony Pelvis: The visualized bony structures of the pelvis appear unremarkable.    Notifications: The results were discussed with the emergency room physician (Dr. Chaudhary) prior to dictation at 2025-07-07 04:05:15 CDT.                                       I have reviewed all pertinent imaging results/findings within the past 24 hours.    ASSESSMENT & PLAN:    34 year old male without significant PMHx who presented to [facility] 7/7 with abdominal pain associated with hernia. General surgery consulted to evaluate. Imaging with large midline umbilical ventral hernia with transverse colon and small intestine within the defect; evidence of small bowel obstruction. Presentation consistent with incarcerated, non-strangulated ventral hernia.     - Admit to surgery   - OR 7/7 for open ventral hernia repair - discussed with patient that given BMI 46 and tobacco use, he is at high risk for recurrence of hernia. Risks, benefits, and alternatives discussed; patient understands and is agreeable to proceed.   - NPO since midnight   - mIVF   - Anti-emetics PRN   - Multimodal pain control   - DVT ppx - lov    Nancy Reed MD   [employer] General Surgery, PGY-2            [1]   Social History  Tobacco Use   Smoking Status Every Day    Current packs/day: 0.50    Types: Cigarettes   Smokeless Tobacco Never

## 2025-07-08 LAB
ANION GAP SERPL CALC-SCNC: 5 MEQ/L
BASOPHILS # BLD AUTO: 0.03 X10(3)/MCL
BASOPHILS NFR BLD AUTO: 0.5 %
BUN SERPL-MCNC: 12.8 MG/DL (ref 8.9–20.6)
CALCIUM SERPL-MCNC: 8.1 MG/DL (ref 8.4–10.2)
CHLORIDE SERPL-SCNC: 106 MMOL/L (ref 98–107)
CO2 SERPL-SCNC: 29 MMOL/L (ref 22–29)
CREAT SERPL-MCNC: 0.69 MG/DL (ref 0.72–1.25)
CREAT/UREA NIT SERPL: 19
EOSINOPHIL # BLD AUTO: 0.17 X10(3)/MCL (ref 0–0.9)
EOSINOPHIL NFR BLD AUTO: 2.6 %
ERYTHROCYTE [DISTWIDTH] IN BLOOD BY AUTOMATED COUNT: 13.4 % (ref 11.5–17)
GFR SERPLBLD CREATININE-BSD FMLA CKD-EPI: >60 ML/MIN/1.73/M2
GLUCOSE SERPL-MCNC: 91 MG/DL (ref 74–100)
HCT VFR BLD AUTO: 38.9 % (ref 42–52)
HGB BLD-MCNC: 12.1 G/DL (ref 14–18)
IMM GRANULOCYTES # BLD AUTO: 0.01 X10(3)/MCL (ref 0–0.04)
IMM GRANULOCYTES NFR BLD AUTO: 0.2 %
LYMPHOCYTES # BLD AUTO: 2.29 X10(3)/MCL (ref 0.6–4.6)
LYMPHOCYTES NFR BLD AUTO: 34.9 %
MAGNESIUM SERPL-MCNC: 2 MG/DL (ref 1.6–2.6)
MCH RBC QN AUTO: 26.8 PG (ref 27–31)
MCHC RBC AUTO-ENTMCNC: 31.1 G/DL (ref 33–36)
MCV RBC AUTO: 86.1 FL (ref 80–94)
MONOCYTES # BLD AUTO: 1.05 X10(3)/MCL (ref 0.1–1.3)
MONOCYTES NFR BLD AUTO: 16 %
NEUTROPHILS # BLD AUTO: 3.02 X10(3)/MCL (ref 2.1–9.2)
NEUTROPHILS NFR BLD AUTO: 45.8 %
NRBC BLD AUTO-RTO: 0 %
PHOSPHATE SERPL-MCNC: 2.8 MG/DL (ref 2.3–4.7)
PLATELET # BLD AUTO: 254 X10(3)/MCL (ref 130–400)
PMV BLD AUTO: 10.8 FL (ref 7.4–10.4)
POTASSIUM SERPL-SCNC: 3.9 MMOL/L (ref 3.5–5.1)
PSYCHE PATHOLOGY RESULT: NORMAL
RBC # BLD AUTO: 4.52 X10(6)/MCL (ref 4.7–6.1)
SODIUM SERPL-SCNC: 140 MMOL/L (ref 136–145)
WBC # BLD AUTO: 6.57 X10(3)/MCL (ref 4.5–11.5)

## 2025-07-08 PROCEDURE — 83735 ASSAY OF MAGNESIUM: CPT

## 2025-07-08 PROCEDURE — 80048 BASIC METABOLIC PNL TOTAL CA: CPT

## 2025-07-08 PROCEDURE — 97162 PT EVAL MOD COMPLEX 30 MIN: CPT

## 2025-07-08 PROCEDURE — 99233 SBSQ HOSP IP/OBS HIGH 50: CPT | Mod: ,,, | Performed by: SURGERY

## 2025-07-08 PROCEDURE — 11000001 HC ACUTE MED/SURG PRIVATE ROOM

## 2025-07-08 PROCEDURE — 25000003 PHARM REV CODE 250

## 2025-07-08 PROCEDURE — 63600175 PHARM REV CODE 636 W HCPCS

## 2025-07-08 PROCEDURE — 84100 ASSAY OF PHOSPHORUS: CPT

## 2025-07-08 PROCEDURE — 36415 COLL VENOUS BLD VENIPUNCTURE: CPT

## 2025-07-08 PROCEDURE — 96372 THER/PROPH/DIAG INJ SC/IM: CPT

## 2025-07-08 PROCEDURE — 97166 OT EVAL MOD COMPLEX 45 MIN: CPT

## 2025-07-08 PROCEDURE — 85025 COMPLETE CBC W/AUTO DIFF WBC: CPT

## 2025-07-08 RX ORDER — OXYCODONE HYDROCHLORIDE 10 MG/1
10 TABLET ORAL EVERY 6 HOURS PRN
Refills: 0 | Status: DISCONTINUED | OUTPATIENT
Start: 2025-07-08 | End: 2025-07-11 | Stop reason: HOSPADM

## 2025-07-08 RX ORDER — OXYCODONE HYDROCHLORIDE 5 MG/1
5 TABLET ORAL EVERY 6 HOURS PRN
Refills: 0 | Status: DISCONTINUED | OUTPATIENT
Start: 2025-07-08 | End: 2025-07-11 | Stop reason: HOSPADM

## 2025-07-08 RX ORDER — MORPHINE SULFATE 4 MG/ML
2 INJECTION, SOLUTION INTRAMUSCULAR; INTRAVENOUS EVERY 4 HOURS PRN
Status: DISCONTINUED | OUTPATIENT
Start: 2025-07-08 | End: 2025-07-09

## 2025-07-08 RX ADMIN — OXYCODONE HYDROCHLORIDE 10 MG: 10 TABLET ORAL at 09:07

## 2025-07-08 RX ADMIN — MORPHINE SULFATE 2 MG: 4 INJECTION, SOLUTION INTRAMUSCULAR; INTRAVENOUS at 01:07

## 2025-07-08 RX ADMIN — METHOCARBAMOL 1000 MG: 100 INJECTION INTRAMUSCULAR; INTRAVENOUS at 03:07

## 2025-07-08 RX ADMIN — ACETAMINOPHEN 1000 MG: 10 INJECTION, SOLUTION INTRAVENOUS at 06:07

## 2025-07-08 RX ADMIN — METHOCARBAMOL 1000 MG: 100 INJECTION INTRAMUSCULAR; INTRAVENOUS at 06:07

## 2025-07-08 RX ADMIN — ENOXAPARIN SODIUM 40 MG: 40 INJECTION SUBCUTANEOUS at 09:07

## 2025-07-08 RX ADMIN — MORPHINE SULFATE 2 MG: 4 INJECTION INTRAVENOUS at 07:07

## 2025-07-08 RX ADMIN — METHOCARBAMOL 1000 MG: 100 INJECTION INTRAMUSCULAR; INTRAVENOUS at 09:07

## 2025-07-08 RX ADMIN — ONDANSETRON 4 MG: 2 INJECTION INTRAMUSCULAR; INTRAVENOUS at 12:07

## 2025-07-08 RX ADMIN — SODIUM CHLORIDE, POTASSIUM CHLORIDE, SODIUM LACTATE AND CALCIUM CHLORIDE: 600; 310; 30; 20 INJECTION, SOLUTION INTRAVENOUS at 06:07

## 2025-07-08 RX ADMIN — SODIUM CHLORIDE, POTASSIUM CHLORIDE, SODIUM LACTATE AND CALCIUM CHLORIDE: 600; 310; 30; 20 INJECTION, SOLUTION INTRAVENOUS at 03:07

## 2025-07-08 RX ADMIN — MORPHINE SULFATE 2 MG: 4 INJECTION, SOLUTION INTRAMUSCULAR; INTRAVENOUS at 06:07

## 2025-07-08 NOTE — PT/OT/SLP EVAL
"Physical Therapy Evaluation    Patient Name:  Romario Smith   MRN:  [id number]    Recommendations:     Discharge therapy intensity: Low Intensity Therapy   Discharge Equipment Recommendations: shower chair, walker, rolling   Barriers to discharge: Inaccessible home and Ongoing medical needs    Assessment:     Romario Smith is a 34 y.o. male admitted with a medical diagnosis of s/p open primary ventral hernia repair 7/7.  He presents with the following impairments/functional limitations: weakness, impaired endurance, impaired self care skills, impaired functional mobility, gait instability, impaired balance, pain.    Pt tolerated PT eval well, although requiring increased time for all mobility 2/2 abdominal pain. Pt performing supine>sit with SBA, STS with Kirti and a RW, and able to ambulate x 180' SBA-CGA with RW and slow gait speed with numerous standing rest breaks. Attempted to trial gait with no device but pt unable to decrease support on walker due to his abdominal discomfort. Pt does live in a 2nd floor apt and will need stair training prior to d/c, but anticipate to be able to go home with low intensity therapy.      Rehab Prognosis: Good; patient would benefit from acute skilled PT services to address these deficits and reach maximum level of function.    Recent Surgery: Procedure(s) (LRB):  REPAIR, VENTRAL HERNIA - OPEN (N/A) 1 Day Post-Op    Plan:     During this hospitalization, patient would benefit from acute PT services 5 x/week to address the identified rehab impairments via gait training, therapeutic activities, therapeutic exercises and progress toward the following goals:    Plan of Care Expires:  08/08/25    Subjective     Chief Complaint: "it feels like everything is about to pop open"  Patient/Family Comments/goals: to return to PLOF  Pain/Comfort:  Pain Rating 1: 7/10  Location - Orientation 1: generalized  Location 1: abdomen  Pain Addressed 1: Reposition, Distraction, Cessation of " Activity    Patients cultural, spiritual, Yazdanism conflicts given the current situation: no    Living Environment:  Pt lives alone in a 2nd floor apartment with B rails to enter.   Prior to admission, patients level of function was Ind.  Equipment used at home: none.  DME owned (not currently used): none.  Upon discharge, patient will have assistance from friends and family who can stay with him and provide 24/7 support if needed.    Objective:     Communicated with RN prior to session.  Patient found HOB elevated with MATT drain, peripheral IV, telemetry, abdominal binder upon PT entry to room.    General Precautions: Standard, fall (abd binder at all times)  Orthopedic Precautions:N/A   Braces: N/A  Respiratory Status: Room air      Exams:  RLE Strength: WFL  LLE Strength: WFL  Skin integrity: Visible skin intact      Functional Mobility: increased time required for all mobility 2/2 pain  Bed Mobility:     Supine to Sit: stand by assistance  Transfers:     Sit to Stand:  minimum assistance with rolling walker  Gait: Pt ambulated x 180' SBA-CGA with RW, slow step-through gait speed. Pt requiring multiple standing rest breaks 2/2 abdominal discomfort but no major LOB or safety concerns  Balance: good      AM-PAC 6 CLICK MOBILITY  Total Score:19       Treatment & Education:  Patient provided with verbal education education regarding PT role/goals/POC, fall prevention, safety awareness, and discharge/DME recommendations.  Understanding was verbalized.     Patient left up in chair with all lines intact, call button in reach, and nurse notified.      GOALS:   Multidisciplinary Problems       Physical Therapy Goals          Problem: Physical Therapy    Goal Priority Disciplines Outcome Interventions   Physical Therapy Goal     PT, PT/OT Progressing    Description: Goals to be met by: 25     Patient will increase functional independence with mobility by performin. Supine to sit with Modified [place]  2.  Sit to supine with Modified [place]  3. Sit to stand transfer with Modified [place]  4. Bed to chair transfer with Modified [place] using LRAD  5. Gait  x 150 feet with Modified [place] using LRAD.   6. Ascend/descend 1 flight of stairs with bilateral Handrails Stand-by Assistance using LRAD.                          History:     No past medical history on file.    Past Surgical History:   Procedure Laterality Date    REPAIR, HERNIA, VENTRAL N/A 7/7/2025    Procedure: REPAIR, VENTRAL HERNIA - OPEN;  Surgeon: Rony Brown Jr., MD;  Location: [facility];  Service: General;  Laterality: N/A;       Time Tracking:     PT Received On: 07/08/25  PT Start Time: 1005     PT Stop Time: 1027  PT Total Time (min): 22 min     Billable Minutes: Evaluation 22 07/08/2025

## 2025-07-08 NOTE — PLAN OF CARE
Problem: Occupational Therapy  Goal: Occupational Therapy Goal  Description: LTG: Pt will perform basic ADLs and ADL transfers with Modified independence using LRAD by discharge.    STG: to be met by 7/22    Pt will complete grooming standing at sink with LRAD with SBA.  Pt will complete UB dressing with SBA.  Pt will complete LB dressing with SBA using LRAD.  Pt will complete toileting with SBA using LRAD.  Pt will complete functional mobility to/from toilet and toilet transfer with SBA using LRAD.    Outcome: Progressing

## 2025-07-08 NOTE — PLAN OF CARE
Problem: Physical Therapy  Goal: Physical Therapy Goal  Description: Goals to be met by: 25     Patient will increase functional independence with mobility by performin. Supine to sit with Modified [place]  2. Sit to supine with Modified [place]  3. Sit to stand transfer with Modified [place]  4. Bed to chair transfer with Modified [place] using LRAD  5. Gait  x 150 feet with Modified [place] using LRAD.   6. Ascend/descend 1 flight of stairs with bilateral Handrails Stand-by Assistance using LRAD.     Outcome: Progressing

## 2025-07-08 NOTE — PROGRESS NOTES
"   Acute Care Surgery   Progress Note  Admit Date: 7/7/2025  HD#0  POD#1 Day Post-Op    Subjective:   Interval history:  Events:  7/7 - Open Ventral Hernia Repair     NAEON; AF, HDS   Primarily complaining of abdominal pain this AM along midline incision.   Denies nausea, vomiting, passing flatus, or having a BM. Voiding spontaneously     Output/24 hours   - MATT Drain - 80 cc serosanguineous     Home Meds:  Current Outpatient Medications   Medication Instructions    acetaminophen (TYLENOL) 1,000 mg, Oral, Every 6 hours PRN    ARIPiprazole (ABILIFY) 5 mg, Oral    gabapentin (NEURONTIN) 800 mg, 3 times daily    HYDROcodone-acetaminophen (NORCO) 5-325 mg per tablet 1 tablet, Oral, Every 8 hours PRN    ibuprofen (ADVIL,MOTRIN) 600 mg, Oral, Every 6 hours PRN    ondansetron (ZOFRAN-ODT) 4 mg, Oral, Every 8 hours PRN      Scheduled Meds:   acetaminophen  1,000 mg Intravenous Q8H    enoxparin  40 mg Subcutaneous Q12H    methocarbamoL  1,000 mg Intravenous Q8H     Continuous Infusions:   lactated ringers   Intravenous Continuous 125 mL/hr at 07/07/25 2025 New Bag at 07/07/25 2025     PRN Meds:  Current Facility-Administered Medications:     morphine, 1 mg, Intravenous, Q4H PRN    morphine, 2 mg, Intravenous, Q4H PRN    ondansetron, 4 mg, Intravenous, Q4H PRN    sodium chloride 0.9%, 10 mL, Intravenous, PRN     Objective:     VITAL SIGNS: 24 HR MIN & MAX LAST   Temp  Min: 97.9 °F (36.6 °C)  Max: 99.2 °F (37.3 °C)  98.4 °F (36.9 °C)   BP  Min: 99/72  Max: 158/98  126/79    Pulse  Min: 69  Max: 91  84    Resp  Min: 14  Max: 20  18    SpO2  Min: 90 %  Max: 100 %  (!) 92 %      HT: 5' 10" (177.8 cm)  WT: (!) 154.5 kg (340 lb 9.8 oz)  BMI: 48.9     Intake/output:  Intake/Output - Last 3 Shifts         07/06 0700 07/07 0659 07/07 0700 07/08 0659    IV Piggyback 999 2100    Total Intake(mL/kg) 999 (6.8) 2100 (13.6)    Urine (mL/kg/hr)  700 (0.2)    Drains  60    Total Output  760    Net +999 +1340                  Intake/Output " Summary (Last 24 hours) at 7/8/2025 0556  Last data filed at 7/8/2025 0054  Gross per 24 hour   Intake 2100 ml   Output 760 ml   Net 1340 ml           Lines/drains/airway:  Peripheral IV Single Lumen 07/07/25 0217 20 G Posterior;Proximal;Right Forearm (Active)   Site Assessment Clean;Dry;Intact 07/08/25 0305   Line Securement Device Secured with sutureless device 07/07/25 1800   Extremity Assessment Distal to IV No abnormal discoloration;No redness;No swelling;No warmth 07/07/25 1800   Line Status Infusing 07/08/25 0305   Dressing Status Clean;Dry;Intact 07/08/25 0305   Dressing Intervention Integrity maintained 07/08/25 0305   Number of days: 1       Peripheral IV Single Lumen 07/07/25 0815 18 G Left Antecubital (Active)   Site Assessment Clean;Dry;Intact 07/08/25 0305   Line Securement Device Secured with sutureless device 07/07/25 1800   Extremity Assessment Distal to IV No abnormal discoloration;No redness;No swelling;No warmth 07/07/25 1800   Line Status Saline locked 07/08/25 0305   Dressing Status Clean;Dry;Intact 07/08/25 0305   Dressing Intervention Integrity maintained 07/08/25 0305   Number of days: 0            Closed/Suction Drain 07/07/25 Medial RUQ Bulb 19 Fr. (Active)   Site Description Unable to view 07/08/25 0200   Dressing Type Gauze;Transparent (Tegaderm) 07/08/25 0200   Dressing Status Clean;Dry;Intact 07/08/25 0200   Dressing Intervention Integrity maintained 07/08/25 0200   Drainage Sanguineous 07/08/25 0200   Status To bulb suction 07/08/25 0200   Output (mL) 20 mL 07/07/25 1800   Number of days: 1       Physical examination:  Gen: NAD, AAOx3, answering questions appropriately  HEENT: normocephalic, atraumatic  CV: RR  Resp: NWOB  Abd: Soft, non-distended; appropriately tender to palpation along midline surgical incision site. Dressings in place; clean/dry, non-saturated.   Ext: moving all extremities spontaneously and purposefully   LDA: MATT drain within right subcutaneous abdominal wall  "    Labs:  Renal:  Recent Labs     07/07/25 0247   BUN 17.1   CREATININE 0.84     No results for input(s): "LACTIC" in the last 72 hours.  FENGI:  Recent Labs     07/07/25 0247      K 3.5      CO2 32*   CALCIUM 9.9   PROT 8.4*   ALBUMIN 3.7   BILITOT 0.5   AST 18   ALKPHOS 81   ALT 16     Heme:  Recent Labs     07/07/25 0247 07/07/25 0453   HGB 15.3  --    HCT 49.9  --      --    INR  --  1.1     ID:  Recent Labs     07/07/25 0247   WBC 7.50     CBG:  No results for input(s): "GLUCOSE" in the last 72 hours.   Cardiovascular:  No results for input(s): "TROPONINI", "CKTOTAL", "CKMB", "BNP" in the last 168 hours.  I have reviewed all pertinent lab results within the past 24 hours.    Imaging:  CT Abdomen Pelvis With IV Contrast NO Oral Contrast   Final Result      Midline ventral hernia containing transverse colon and small bowel with resultant small bowel obstruction.  There is no evidence of ischemia or perforation.  There is a small amount of free fluid in the herniating sac with associated mesenteric inflammatory stranding consistent with incarceration and resultant obstruction.      [place] concordance         Electronically signed by: All Simmons MD   Date:    07/07/2025   Time:    04:20         I have reviewed all pertinent imaging results/findings within the past 24 hours.    Micro/Path/Other:  Microbiology Results (last 7 days)       ** No results found for the last 168 hours. **           Pathology Results  (Last 7 days)      None             Assessment & Plan:   34 year old male without significant PMHx who presented to [facility] 7/7 with abdominal pain associated with hernia. General surgery consulted to evaluate. Imaging with large midline umbilical ventral hernia with transverse colon and small intestine within the defect; evidence of small bowel obstruction. Presentation consistent with incarcerated, non-strangulated ventral hernia. S/p open primary ventral hernia repair 7/7.     - " Trial clear liquid diet - advance as tolerated,  wean mIVF once tolerating diet. Anti-emetics PRN   - Multimodal pain control   - Abdominal binder at all times   - PT/OT; encourage OOB to chair & ambulation   - DVT ppx - lovenox 40 q12 hours   - Offer smoking cessation     Dispo: continue inpatient care     Nancy Reed MD  [employer] General Surgery PGY-2

## 2025-07-08 NOTE — PT/OT/SLP EVAL
Occupational Therapy  Evaluation    Name: Romario Smith  MRN: [id number]  Recent Surgery: Procedure(s) (LRB):  REPAIR, VENTRAL HERNIA - OPEN (N/A) 1 Day Post-Op    Recommendations:     Discharge therapy intensity: Low Intensity Therapy   Discharge Equipment Recommendations:  other (see comments) (bariatric rolling walker and shower chair)  Barriers to discharge:  Inaccessible home environment    Assessment:     Romario Smith is a 34 y.o. male with a medical diagnosis of abdominal pain associated with hernia, imaging with large middline umbilical ventral hernia with transverse colon and small intestine within the defect; evidence of small bowel obstruction s/p open primary ventral hernia repair 7/7.  He presents with the following performance deficits affecting function: weakness, impaired endurance, impaired self care skills, gait instability, decreased safety awareness, pain. Patient with fair-goof tolerance for today's session. Patient presents with primary limitations of abdominal pain, gait instability, unfamiliarity with adaptive techniques for BADLs, limited standing balance/tolerance, nausea, and requires min A for functional transfers with RW limiting independence in daily meaningful occupations. Patient to continue to benefit from rigorous acute occupational therapy to address aforementioned deficits to improve independence in BADLs, reduce risk of falls, and improve safety  prior to next level of care.     DME Justification:  Romario's mobility limitation cannot be sufficiently resolved by the use of a cane. His functional mobility deficit can be sufficiently resolved with the use of a Bariatric Rolling Walker. Patient's mobility limitation significantly impairs their ability to participate in one of more activities of daily living.  The use of a RW will significantly improve the patient's ability to participate in MRADLS and the patient will use it on regular basis in the home.    Rehab Prognosis:  Good; patient would benefit from acute skilled OT services to address these deficits and reach maximum level of function.       Plan:     Patient to be seen 5 x/week to address the above listed problems via self-care/home management, therapeutic activities, therapeutic exercises  Plan of Care Expires: 08/05/25  Plan of Care Reviewed with: patient    Subjective     Chief Complaint: Abdominal pain   Patient/Family Comments/goals: Return home to [facility]    Occupational Profile:  Living Environment: Patient lives alone in 2-story apartment, endorses good support from family/friends   Previous level of function: Independent, working as CNA in [employer]   Equipment Used at Home: none  Assistance upon Discharge: Family/friends to assist as needed     Pain/Comfort:  Pain Rating 1: 7/10  Location - Orientation 1: transverse  Location 1: abdomen  Pain Addressed 1: Reposition, Distraction, Cessation of Activity    Patients cultural, spiritual, Scientologist conflicts given the current situation: no    Objective:     OT communicated with RN prior to session.      Patient was found supine with MATT drain, peripheral IV, telemetry upon OT entry to room.    General Precautions: Standard, fall, other (see comments) (abdominal binder at all times)  Orthopedic Precautions: N/A  Braces: N/A      Functional Mobility/Transfers:  Bed mobility:    Supine to Sit: supervision  Transfers: Sit to Stand: minimum assistance with rolling walker  Functional Mobility: Patient complete functional mobility within hospital room/hallway with RW and recliner chair follow; req CGA throughout for safety/balance and intermittent static standing rest breaks secondary to pain/abdominal discomfort. Refer to PT report for distance/duration.     Activities of Daily Living:  Lower Body Dressing: total assistance Patient req total assist to don B socks supine in bed; educated on adaptive positioning techniques (modified figure-4) for improved independence with lower  body dressing/ reduce abdominal pain. Patient demo ability to complete adaptive positioning strategies yet limited by pain.     [facility] 6 Click ADL:  AMPAC Total Score: 15    Functional Cognition:  Intact    Visual Perceptual Skills:  Intact    Upper Extremity Function:  Right Upper Extremity:   WFL    Left Upper Extremity:  WFL    Balance:   Intact    Therapeutic Positioning  Risk for acquired pressure injuries is increased due to relative decrease in mobility d/t hospitalization  and obesity.    OT interventions performed during the course of today's session:   Education was provided on benefits of and recommendations for therapeutic positioning    Skin assessment: all bony prominences were assessed    Findings: no redness or breakdown noted    OT recommendations for therapeutic positioning throughout hospitalization:   Follow [facility] Pressure Injury Prevention Protocol    Co-Treatment: Yes, due to Limited activity tolerance    Patient Education:  Patient provided with verbal education education regarding OT role/goals/POC, post op precautions, fall prevention, safety awareness, and Discharge/DME recommendations.  Understanding was verbalized.     Patient left up in chair with all lines intact, call button in reach, and RN notified.    GOALS:   Multidisciplinary Problems       Occupational Therapy Goals          Problem: Occupational Therapy    Goal Priority Disciplines Outcome Interventions   Occupational Therapy Goal     OT, PT/OT Progressing    Description: LTG: Pt will perform basic ADLs and ADL transfers with Modified independence using LRAD by discharge.    STG: to be met by 7/22    Pt will complete grooming standing at sink with LRAD with SBA.  Pt will complete UB dressing with SBA.  Pt will complete LB dressing with SBA using LRAD.  Pt will complete toileting with SBA using LRAD.  Pt will complete functional mobility to/from toilet and toilet transfer with SBA using LRAD.                         History:      No past medical history on file.      Past Surgical History:   Procedure Laterality Date    REPAIR, HERNIA, VENTRAL N/A 7/7/2025    Procedure: REPAIR, VENTRAL HERNIA - OPEN;  Surgeon: Rony Brown Jr., MD;  Location: [facility];  Service: General;  Laterality: N/A;       Time Tracking:     OT Date of Treatment: 07/08/25  OT Start Time: 1005  OT Stop Time: 1029  OT Total Time (min): 24 min    Billable Minutes:Evaluation 24    7/8/2025

## 2025-07-09 PROBLEM — Z98.890 S/P EXPLORATORY LAPAROTOMY: Status: ACTIVE | Noted: 2025-07-09

## 2025-07-09 LAB
ABS NEUT (OLG): 2.94 X10(3)/MCL (ref 2.1–9.2)
ALBUMIN SERPL-MCNC: 2.9 G/DL (ref 3.5–5)
ALBUMIN/GLOB SERPL: 0.7 RATIO (ref 1.1–2)
ALP SERPL-CCNC: 60 UNIT/L (ref 40–150)
ALT SERPL-CCNC: 17 UNIT/L (ref 0–55)
ANION GAP SERPL CALC-SCNC: 12 MEQ/L
AST SERPL-CCNC: 16 UNIT/L (ref 11–45)
BILIRUB SERPL-MCNC: 0.6 MG/DL
BUN SERPL-MCNC: 11.5 MG/DL (ref 8.9–20.6)
CALCIUM SERPL-MCNC: 8.9 MG/DL (ref 8.4–10.2)
CHLORIDE SERPL-SCNC: 103 MMOL/L (ref 98–107)
CO2 SERPL-SCNC: 26 MMOL/L (ref 22–29)
CREAT SERPL-MCNC: 0.66 MG/DL (ref 0.72–1.25)
CREAT/UREA NIT SERPL: 17
EOSINOPHIL NFR BLD MANUAL: 0.28 X10(3)/MCL (ref 0–0.9)
EOSINOPHIL NFR BLD MANUAL: 5 %
ERYTHROCYTE [DISTWIDTH] IN BLOOD BY AUTOMATED COUNT: 13.2 % (ref 11.5–17)
GFR SERPLBLD CREATININE-BSD FMLA CKD-EPI: >60 ML/MIN/1.73/M2
GLOBULIN SER-MCNC: 4.1 GM/DL (ref 2.4–3.5)
GLUCOSE SERPL-MCNC: 101 MG/DL (ref 74–100)
HCT VFR BLD AUTO: 40.6 % (ref 42–52)
HGB BLD-MCNC: 12.4 G/DL (ref 14–18)
INSTRUMENT WBC (OLG): 5.55 X10(3)/MCL
LYMPHOCYTES NFR BLD MANUAL: 1.22 X10(3)/MCL (ref 0.6–4.6)
LYMPHOCYTES NFR BLD MANUAL: 22 %
MAGNESIUM SERPL-MCNC: 1.7 MG/DL (ref 1.6–2.6)
MCH RBC QN AUTO: 26.3 PG (ref 27–31)
MCHC RBC AUTO-ENTMCNC: 30.5 G/DL (ref 33–36)
MCV RBC AUTO: 86.2 FL (ref 80–94)
METAMYELOCYTES NFR BLD MANUAL: 4 %
MONOCYTES NFR BLD MANUAL: 0.83 X10(3)/MCL (ref 0.1–1.3)
MONOCYTES NFR BLD MANUAL: 15 %
NEUTROPHILS NFR BLD MANUAL: 53 %
PHOSPHATE SERPL-MCNC: 2.7 MG/DL (ref 2.3–4.7)
PLATELET # BLD AUTO: 271 X10(3)/MCL (ref 130–400)
PLATELET # BLD EST: NORMAL 10*3/UL
PMV BLD AUTO: 11.1 FL (ref 7.4–10.4)
POTASSIUM SERPL-SCNC: 3.5 MMOL/L (ref 3.5–5.1)
PROT SERPL-MCNC: 7 GM/DL (ref 6.4–8.3)
RBC # BLD AUTO: 4.71 X10(6)/MCL (ref 4.7–6.1)
RBC MORPH BLD: NORMAL
SODIUM SERPL-SCNC: 141 MMOL/L (ref 136–145)
WBC # BLD AUTO: 5.55 X10(3)/MCL (ref 4.5–11.5)

## 2025-07-09 PROCEDURE — 99233 SBSQ HOSP IP/OBS HIGH 50: CPT | Mod: ,,, | Performed by: SURGERY

## 2025-07-09 PROCEDURE — 11000001 HC ACUTE MED/SURG PRIVATE ROOM

## 2025-07-09 PROCEDURE — 80053 COMPREHEN METABOLIC PANEL: CPT

## 2025-07-09 PROCEDURE — 84100 ASSAY OF PHOSPHORUS: CPT

## 2025-07-09 PROCEDURE — 63600175 PHARM REV CODE 636 W HCPCS

## 2025-07-09 PROCEDURE — 97116 GAIT TRAINING THERAPY: CPT | Mod: CQ

## 2025-07-09 PROCEDURE — 83735 ASSAY OF MAGNESIUM: CPT

## 2025-07-09 PROCEDURE — 85025 COMPLETE CBC W/AUTO DIFF WBC: CPT

## 2025-07-09 PROCEDURE — 25000003 PHARM REV CODE 250

## 2025-07-09 PROCEDURE — 36415 COLL VENOUS BLD VENIPUNCTURE: CPT

## 2025-07-09 RX ORDER — ENOXAPARIN SODIUM 100 MG/ML
40 INJECTION SUBCUTANEOUS EVERY 12 HOURS
Status: DISCONTINUED | OUTPATIENT
Start: 2025-07-09 | End: 2025-07-10

## 2025-07-09 RX ORDER — POLYETHYLENE GLYCOL 3350 17 G/17G
17 POWDER, FOR SOLUTION ORAL DAILY
Status: DISCONTINUED | OUTPATIENT
Start: 2025-07-09 | End: 2025-07-11 | Stop reason: HOSPADM

## 2025-07-09 RX ORDER — MORPHINE SULFATE 4 MG/ML
2 INJECTION, SOLUTION INTRAMUSCULAR; INTRAVENOUS EVERY 6 HOURS PRN
Status: DISCONTINUED | OUTPATIENT
Start: 2025-07-09 | End: 2025-07-11 | Stop reason: HOSPADM

## 2025-07-09 RX ORDER — ENOXAPARIN SODIUM 100 MG/ML
40 INJECTION SUBCUTANEOUS EVERY 24 HOURS
Status: DISCONTINUED | OUTPATIENT
Start: 2025-07-09 | End: 2025-07-09

## 2025-07-09 RX ORDER — ACETAMINOPHEN 325 MG/1
650 TABLET ORAL EVERY 6 HOURS
Status: DISCONTINUED | OUTPATIENT
Start: 2025-07-09 | End: 2025-07-11 | Stop reason: HOSPADM

## 2025-07-09 RX ORDER — GABAPENTIN 300 MG/1
600 CAPSULE ORAL 3 TIMES DAILY
Status: DISCONTINUED | OUTPATIENT
Start: 2025-07-09 | End: 2025-07-11 | Stop reason: HOSPADM

## 2025-07-09 RX ADMIN — ENOXAPARIN SODIUM 40 MG: 40 INJECTION SUBCUTANEOUS at 09:07

## 2025-07-09 RX ADMIN — GABAPENTIN 600 MG: 300 CAPSULE ORAL at 09:07

## 2025-07-09 RX ADMIN — ONDANSETRON 4 MG: 2 INJECTION INTRAMUSCULAR; INTRAVENOUS at 04:07

## 2025-07-09 RX ADMIN — ACETAMINOPHEN 650 MG: 325 TABLET ORAL at 09:07

## 2025-07-09 RX ADMIN — MORPHINE SULFATE 2 MG: 4 INJECTION INTRAVENOUS at 03:07

## 2025-07-09 RX ADMIN — ACETAMINOPHEN 650 MG: 325 TABLET ORAL at 03:07

## 2025-07-09 RX ADMIN — GABAPENTIN 600 MG: 300 CAPSULE ORAL at 03:07

## 2025-07-09 RX ADMIN — ONDANSETRON 4 MG: 2 INJECTION INTRAMUSCULAR; INTRAVENOUS at 03:07

## 2025-07-09 RX ADMIN — DOCUSATE SODIUM 50 MG: 50 CAPSULE, LIQUID FILLED ORAL at 09:07

## 2025-07-09 RX ADMIN — SODIUM CHLORIDE, POTASSIUM CHLORIDE, SODIUM LACTATE AND CALCIUM CHLORIDE: 600; 310; 30; 20 INJECTION, SOLUTION INTRAVENOUS at 05:07

## 2025-07-09 RX ADMIN — POLYETHYLENE GLYCOL 3350 17 G: 17 POWDER, FOR SOLUTION ORAL at 09:07

## 2025-07-09 RX ADMIN — METHOCARBAMOL 1000 MG: 100 INJECTION INTRAMUSCULAR; INTRAVENOUS at 07:07

## 2025-07-09 RX ADMIN — ONDANSETRON 4 MG: 2 INJECTION INTRAMUSCULAR; INTRAVENOUS at 08:07

## 2025-07-09 RX ADMIN — MORPHINE SULFATE 2 MG: 4 INJECTION INTRAVENOUS at 04:07

## 2025-07-09 NOTE — PT/OT/SLP PROGRESS
Occupational Therapy      Patient Name:  Romario Smith   MRN:  [id number]    Attempted therapy at 1005 pt politely declined 2/2 abdominal pain and asked therapy to return later. Returned at 1143, pt declined upon 2nd attempt 2/2 pain and fatigue, despite max education and encouragement provided to participate in OT session. Pt reports ambulating to/from restroom using RW. Will follow-up tomorrow or as appropriate.    7/9/2025

## 2025-07-09 NOTE — PT/OT/SLP PROGRESS
Physical Therapy Treatment    Patient Name:  Romario Smith   MRN:  [id number]    Recommendations:     Discharge therapy intensity: Low Intensity Therapy   Discharge Equipment Recommendations:   Barriers to discharge: Inaccessible home and Ongoing medical needs    Assessment:     Romario Smith is a 34 y.o. male admitted with a medical diagnosis of abdominal pain associated with hernia, imaging with large middline umbilical ventral hernia with transverse colon and small intestine within the defect; evidence of small bowel obstruction s/p open primary ventral hernia repair 7/7.  He presents with the following impairments/functional limitations: weakness, impaired endurance, impaired self care skills, impaired functional mobility, gait instability, impaired balance, pain.    Rehab Prognosis: Good; patient would benefit from acute skilled PT services to address these deficits and reach maximum level of function.    Recent Surgery: Procedure(s) (LRB):  REPAIR, VENTRAL HERNIA - OPEN (N/A) 2 Days Post-Op    Plan:     During this hospitalization, patient would benefit from acute PT services 5 x/week to address the identified rehab impairments via gait training, therapeutic activities, therapeutic exercises and progress toward the following goals:    Plan of Care Expires:  08/08/25    Subjective     Chief Complaint: abdominal pain  Patient/Family Comments/goals: none stated  Pain/Comfort:  Pain Rating 1:  (not rated)  Location - Orientation 1: generalized  Location 1: abdomen  Pain Addressed 1: Reposition, Distraction      Objective:     Communicated with nursing prior to session.  Patient found HOB elevated with peripheral IV, MATT drain (abd binder) upon PT entry to room.     General Precautions: Standard, fall (Abd binder at all times)  Orthopedic Precautions: N/A  Braces: N/A  Respiratory Status: Room air  Blood Pressure: NT    Functional Mobility:  Bed Mobility:     Supine to Sit: contact guard assistance  Transfers:      Sit to Stand:  contact guard assistance with no AD  Gait: 130' w/no AD, CGA  Slow cautious gait pattern, pt holding stomach throughout    Therapeutic Activities/Exercises:      Co-Treatment: No    Education:  Patient provided with verbal education education regarding PT role/goals/POC, fall prevention, safety awareness, and discharge/DME recommendations.  Understanding was verbalized.     Patient left sitting edge of bed with all lines intact, call button in reach, nurse notified, and friend present      GOALS:   Multidisciplinary Problems       Physical Therapy Goals          Problem: Physical Therapy    Goal Priority Disciplines Outcome Interventions   Physical Therapy Goal     PT, PT/OT Progressing    Description: Goals to be met by: 25     Patient will increase functional independence with mobility by performin. Supine to sit with Modified [place]  2. Sit to supine with Modified [place]  3. Sit to stand transfer with Modified [place]  4. Bed to chair transfer with Modified [place] using LRAD  5. Gait  x 150 feet with Modified [place] using LRAD.   6. Ascend/descend 1 flight of stairs with bilateral Handrails Stand-by Assistance using LRAD.                          Time Tracking:     PT Received On: 25  PT Start Time: 1338     PT Stop Time: 1353  PT Total Time (min): 15 min     Billable Minutes: Gait Training 1    Treatment Type: Treatment  PT/PTA: PTA     Number of PTA visits since last PT visit: 2025

## 2025-07-09 NOTE — PROGRESS NOTES
"   Acute Care Surgery   Progress Note  Admit Date: 7/7/2025  HD#1  POD#2 Days Post-Op    Subjective:   Interval history:  NAEON; T-max 102.9°, HDS   Denies nausea, passing flatus, or having a BM. Voiding spontaneously   Reports 1 episode of emesis overnight along with fever  MATT Drain 20 cc serosanguineous     Home Meds:  Current Outpatient Medications   Medication Instructions    acetaminophen (TYLENOL) 1,000 mg, Oral, Every 6 hours PRN    ARIPiprazole (ABILIFY) 5 mg, Oral    gabapentin (NEURONTIN) 800 mg, 3 times daily    HYDROcodone-acetaminophen (NORCO) 5-325 mg per tablet 1 tablet, Oral, Every 8 hours PRN    ibuprofen (ADVIL,MOTRIN) 600 mg, Oral, Every 6 hours PRN    ondansetron (ZOFRAN-ODT) 4 mg, Oral, Every 8 hours PRN      Scheduled Meds:   acetaminophen  650 mg Oral Q6H    docusate sodium  50 mg Oral Daily    enoxparin  60 mg Subcutaneous Q24H (prophylaxis, 1700)    gabapentin  600 mg Oral TID    methocarbamoL  1,000 mg Intravenous Q8H    polyethylene glycol  17 g Oral Daily     Continuous Infusions:   lactated ringers   Intravenous Continuous 125 mL/hr at 07/08/25 1511 New Bag at 07/08/25 1511     PRN Meds:  Current Facility-Administered Medications:     morphine, 2 mg, Intravenous, Q6H PRN    ondansetron, 4 mg, Intravenous, Q4H PRN    oxyCODONE, 5 mg, Oral, Q6H PRN    oxyCODONE, 10 mg, Oral, Q6H PRN    sodium chloride 0.9%, 10 mL, Intravenous, PRN     Objective:     VITAL SIGNS: 24 HR MIN & MAX LAST   Temp  Min: 98.3 °F (36.8 °C)  Max: 102.9 °F (39.4 °C)  99.3 °F (37.4 °C)   BP  Min: 119/64  Max: 150/97  119/64    Pulse  Min: 78  Max: 118  106    Resp  Min: 18  Max: 18  18    SpO2  Min: 91 %  Max: 94 %  (!) 91 %      HT: 5' 10" (177.8 cm)  WT: (!) 154.5 kg (340 lb 9.8 oz)  BMI: 48.9     Intake/output:  Intake/Output - Last 3 Shifts         07/07 0700 07/08 0659 07/08 0700 07/09 0659    P.O.  480    IV Piggyback 2100     Total Intake(mL/kg) 2100 (13.6) 480 (3.1)    Urine (mL/kg/hr) 700 (0.2) 800 (0.2)    " Drains 60 20    Stool  0    Total Output 760 820    Net +1340 -340          Unmeasured Stool Occurrence  1 x            Intake/Output Summary (Last 24 hours) at 7/9/2025 0646  Last data filed at 7/9/2025 0508  Gross per 24 hour   Intake 480 ml   Output 820 ml   Net -340 ml           Lines/drains/airway:  Peripheral IV Single Lumen 07/07/25 0217 20 G Posterior;Proximal;Right Forearm (Active)   Site Assessment Clean;Dry;Intact 07/08/25 0305   Line Securement Device Secured with sutureless device 07/07/25 1800   Extremity Assessment Distal to IV No abnormal discoloration;No redness;No swelling;No warmth 07/07/25 1800   Line Status Infusing 07/08/25 0305   Dressing Status Clean;Dry;Intact 07/08/25 0305   Dressing Intervention Integrity maintained 07/08/25 0305   Number of days: 1       Peripheral IV Single Lumen 07/07/25 0815 18 G Left Antecubital (Active)   Site Assessment Clean;Dry;Intact 07/08/25 0305   Line Securement Device Secured with sutureless device 07/07/25 1800   Extremity Assessment Distal to IV No abnormal discoloration;No redness;No swelling;No warmth 07/07/25 1800   Line Status Saline locked 07/08/25 0305   Dressing Status Clean;Dry;Intact 07/08/25 0305   Dressing Intervention Integrity maintained 07/08/25 0305   Number of days: 0            Closed/Suction Drain 07/07/25 Medial RUQ Bulb 19 Fr. (Active)   Site Description Unable to view 07/08/25 0200   Dressing Type Gauze;Transparent (Tegaderm) 07/08/25 0200   Dressing Status Clean;Dry;Intact 07/08/25 0200   Dressing Intervention Integrity maintained 07/08/25 0200   Drainage Sanguineous 07/08/25 0200   Status To bulb suction 07/08/25 0200   Output (mL) 20 mL 07/07/25 1800   Number of days: 1       Physical examination:  Gen: NAD, AAOx3, answering questions appropriately  HEENT: normocephalic, atraumatic  CV: RR  Resp: NWOB  Abd: Soft, non-distended; appropriately tender to palpation along midline surgical incision site. Dressings in place; clean/dry,  "non-saturated.   Ext: moving all extremities spontaneously and purposefully   LDA: MATT drain within right subcutaneous abdominal wall     Labs:  Renal:  Recent Labs     07/07/25 0247 07/08/25 0651   BUN 17.1 12.8   CREATININE 0.84 0.69*     No results for input(s): "LACTIC" in the last 72 hours.  FENGI:  Recent Labs     07/07/25 0247 07/08/25 0651    140   K 3.5 3.9    106   CO2 32* 29   CALCIUM 9.9 8.1*   MG  --  2.00   PHOS  --  2.8   PROT 8.4*  --    ALBUMIN 3.7  --    BILITOT 0.5  --    AST 18  --    ALKPHOS 81  --    ALT 16  --      Heme:  Recent Labs     07/07/25 0247 07/07/25  0453 07/08/25 0651   HGB 15.3  --  12.1*   HCT 49.9  --  38.9*     --  254   INR  --  1.1  --      ID:  Recent Labs     07/07/25 0247 07/08/25 0651   WBC 7.50 6.57     CBG:  No results for input(s): "GLUCOSE" in the last 72 hours.   Cardiovascular:  No results for input(s): "TROPONINI", "CKTOTAL", "CKMB", "BNP" in the last 168 hours.  I have reviewed all pertinent lab results within the past 24 hours.    Imaging:  CT Abdomen Pelvis With IV Contrast NO Oral Contrast   Final Result      Midline ventral hernia containing transverse colon and small bowel with resultant small bowel obstruction.  There is no evidence of ischemia or perforation.  There is a small amount of free fluid in the herniating sac with associated mesenteric inflammatory stranding consistent with incarceration and resultant obstruction.      [place] concordance         Electronically signed by: All Simmons MD   Date:    07/07/2025   Time:    04:20         I have reviewed all pertinent imaging results/findings within the past 24 hours.    Micro/Path/Other:  Microbiology Results (last 7 days)       ** No results found for the last 168 hours. **           Pathology Results  (Last 7 days)                 07/07/25 0823  Specimen to Pathology General Surgery Final result             Assessment & Plan:   34 year old male without significant PMHx who " presented to [facility] 7/7 with abdominal pain associated with hernia. General surgery consulted to evaluate. Imaging with large midline umbilical ventral hernia with transverse colon and small intestine within the defect; evidence of small bowel obstruction. Presentation consistent with incarcerated, non-strangulated ventral hernia. S/p open primary ventral hernia repair 7/7.     - continue clear liquid diet - advance as tolerated,  wean mIVF once tolerating diet. Anti-emetics PRN   - Multimodal pain control   - ok to leave abdominal wound open to air   - Abdominal binder at all times   - PT/OT; encourage OOB to chair & ambulation   - DVT ppx - lovenox 60 daily  - Offer smoking cessation     Dispo: continue inpatient care       7/9/2025     The above findings, diagnostics, and treatment plan were discussed with Dr. Rony Brown who will follow with further assessments and recommendations. Please call with any questions, concerns, or clinical status changes.  This note/OR report was created with the assistance of  voice recognition software or phone  dictation.  There may be transcription errors as a result of using this technology however minimal. Effort has been made to assure accuracy of transcription but any obvious errors or omissions should be clarified with the author of the document.

## 2025-07-10 LAB
ALBUMIN SERPL-MCNC: 2.9 G/DL (ref 3.5–5)
ALBUMIN/GLOB SERPL: 0.7 RATIO (ref 1.1–2)
ALP SERPL-CCNC: 63 UNIT/L (ref 40–150)
ALT SERPL-CCNC: 19 UNIT/L (ref 0–55)
ANION GAP SERPL CALC-SCNC: 11 MEQ/L
AST SERPL-CCNC: 14 UNIT/L (ref 11–45)
BASOPHILS # BLD AUTO: 0.06 X10(3)/MCL
BASOPHILS NFR BLD AUTO: 0.8 %
BILIRUB SERPL-MCNC: 0.4 MG/DL
BUN SERPL-MCNC: 11.4 MG/DL (ref 8.9–20.6)
CALCIUM SERPL-MCNC: 8.9 MG/DL (ref 8.4–10.2)
CHLORIDE SERPL-SCNC: 101 MMOL/L (ref 98–107)
CO2 SERPL-SCNC: 29 MMOL/L (ref 22–29)
CREAT SERPL-MCNC: 0.74 MG/DL (ref 0.72–1.25)
CREAT/UREA NIT SERPL: 15
EOSINOPHIL # BLD AUTO: 0.45 X10(3)/MCL (ref 0–0.9)
EOSINOPHIL NFR BLD AUTO: 5.8 %
ERYTHROCYTE [DISTWIDTH] IN BLOOD BY AUTOMATED COUNT: 13.2 % (ref 11.5–17)
GFR SERPLBLD CREATININE-BSD FMLA CKD-EPI: >60 ML/MIN/1.73/M2
GLOBULIN SER-MCNC: 4.3 GM/DL (ref 2.4–3.5)
GLUCOSE SERPL-MCNC: 96 MG/DL (ref 74–100)
HCT VFR BLD AUTO: 40.2 % (ref 42–52)
HGB BLD-MCNC: 12.2 G/DL (ref 14–18)
IMM GRANULOCYTES # BLD AUTO: 0.04 X10(3)/MCL (ref 0–0.04)
IMM GRANULOCYTES NFR BLD AUTO: 0.5 %
LYMPHOCYTES # BLD AUTO: 2.35 X10(3)/MCL (ref 0.6–4.6)
LYMPHOCYTES NFR BLD AUTO: 30.1 %
MAGNESIUM SERPL-MCNC: 1.8 MG/DL (ref 1.6–2.6)
MCH RBC QN AUTO: 26.3 PG (ref 27–31)
MCHC RBC AUTO-ENTMCNC: 30.3 G/DL (ref 33–36)
MCV RBC AUTO: 86.6 FL (ref 80–94)
MONOCYTES # BLD AUTO: 1.27 X10(3)/MCL (ref 0.1–1.3)
MONOCYTES NFR BLD AUTO: 16.2 %
NEUTROPHILS # BLD AUTO: 3.65 X10(3)/MCL (ref 2.1–9.2)
NEUTROPHILS NFR BLD AUTO: 46.6 %
NRBC BLD AUTO-RTO: 0 %
PHOSPHATE SERPL-MCNC: 2.2 MG/DL (ref 2.3–4.7)
PLATELET # BLD AUTO: 268 X10(3)/MCL (ref 130–400)
PMV BLD AUTO: 10.7 FL (ref 7.4–10.4)
POTASSIUM SERPL-SCNC: 3.4 MMOL/L (ref 3.5–5.1)
PROT SERPL-MCNC: 7.2 GM/DL (ref 6.4–8.3)
RBC # BLD AUTO: 4.64 X10(6)/MCL (ref 4.7–6.1)
SODIUM SERPL-SCNC: 141 MMOL/L (ref 136–145)
WBC # BLD AUTO: 7.82 X10(3)/MCL (ref 4.5–11.5)

## 2025-07-10 PROCEDURE — 05HY33Z INSERTION OF INFUSION DEVICE INTO UPPER VEIN, PERCUTANEOUS APPROACH: ICD-10-PCS | Performed by: EMERGENCY MEDICINE

## 2025-07-10 PROCEDURE — 80053 COMPREHEN METABOLIC PANEL: CPT

## 2025-07-10 PROCEDURE — 97116 GAIT TRAINING THERAPY: CPT | Mod: CQ

## 2025-07-10 PROCEDURE — 84100 ASSAY OF PHOSPHORUS: CPT

## 2025-07-10 PROCEDURE — 63600175 PHARM REV CODE 636 W HCPCS

## 2025-07-10 PROCEDURE — 63600175 PHARM REV CODE 636 W HCPCS: Performed by: SURGERY

## 2025-07-10 PROCEDURE — 36410 VNPNXR 3YR/> PHY/QHP DX/THER: CPT

## 2025-07-10 PROCEDURE — C1751 CATH, INF, PER/CENT/MIDLINE: HCPCS

## 2025-07-10 PROCEDURE — 25000003 PHARM REV CODE 250

## 2025-07-10 PROCEDURE — 85025 COMPLETE CBC W/AUTO DIFF WBC: CPT

## 2025-07-10 PROCEDURE — 99233 SBSQ HOSP IP/OBS HIGH 50: CPT | Mod: ,,, | Performed by: SURGERY

## 2025-07-10 PROCEDURE — 83735 ASSAY OF MAGNESIUM: CPT

## 2025-07-10 PROCEDURE — 97535 SELF CARE MNGMENT TRAINING: CPT | Mod: CO

## 2025-07-10 PROCEDURE — 11000001 HC ACUTE MED/SURG PRIVATE ROOM

## 2025-07-10 PROCEDURE — 36415 COLL VENOUS BLD VENIPUNCTURE: CPT

## 2025-07-10 RX ORDER — SODIUM CHLORIDE 0.9 % (FLUSH) 0.9 %
10 SYRINGE (ML) INJECTION EVERY 12 HOURS PRN
Status: DISCONTINUED | OUTPATIENT
Start: 2025-07-10 | End: 2025-07-11 | Stop reason: HOSPADM

## 2025-07-10 RX ORDER — ENOXAPARIN SODIUM 100 MG/ML
40 INJECTION SUBCUTANEOUS EVERY 24 HOURS
Status: DISCONTINUED | OUTPATIENT
Start: 2025-07-10 | End: 2025-07-10

## 2025-07-10 RX ORDER — ENOXAPARIN SODIUM 100 MG/ML
60 INJECTION SUBCUTANEOUS EVERY 24 HOURS
Status: DISCONTINUED | OUTPATIENT
Start: 2025-07-11 | End: 2025-07-10

## 2025-07-10 RX ORDER — PROCHLORPERAZINE EDISYLATE 5 MG/ML
2.5 INJECTION INTRAMUSCULAR; INTRAVENOUS EVERY 4 HOURS PRN
Status: DISCONTINUED | OUTPATIENT
Start: 2025-07-10 | End: 2025-07-10

## 2025-07-10 RX ORDER — ONDANSETRON HYDROCHLORIDE 2 MG/ML
8 INJECTION, SOLUTION INTRAVENOUS EVERY 4 HOURS PRN
Status: DISCONTINUED | OUTPATIENT
Start: 2025-07-10 | End: 2025-07-10

## 2025-07-10 RX ORDER — ONDANSETRON HYDROCHLORIDE 2 MG/ML
8 INJECTION, SOLUTION INTRAVENOUS EVERY 4 HOURS PRN
Status: DISCONTINUED | OUTPATIENT
Start: 2025-07-10 | End: 2025-07-11 | Stop reason: HOSPADM

## 2025-07-10 RX ORDER — ENOXAPARIN SODIUM 100 MG/ML
40 INJECTION SUBCUTANEOUS EVERY 12 HOURS
Status: DISCONTINUED | OUTPATIENT
Start: 2025-07-10 | End: 2025-07-11 | Stop reason: HOSPADM

## 2025-07-10 RX ORDER — PROCHLORPERAZINE EDISYLATE 5 MG/ML
2.5 INJECTION INTRAMUSCULAR; INTRAVENOUS EVERY 4 HOURS PRN
Status: DISCONTINUED | OUTPATIENT
Start: 2025-07-10 | End: 2025-07-11 | Stop reason: HOSPADM

## 2025-07-10 RX ADMIN — SODIUM CHLORIDE, POTASSIUM CHLORIDE, SODIUM LACTATE AND CALCIUM CHLORIDE: 600; 310; 30; 20 INJECTION, SOLUTION INTRAVENOUS at 08:07

## 2025-07-10 RX ADMIN — POTASSIUM PHOSPHATE, MONOBASIC POTASSIUM PHOSPHATE, DIBASIC 15 MMOL: 224; 236 INJECTION, SOLUTION, CONCENTRATE INTRAVENOUS at 12:07

## 2025-07-10 RX ADMIN — ENOXAPARIN SODIUM 40 MG: 40 INJECTION SUBCUTANEOUS at 08:07

## 2025-07-10 RX ADMIN — SODIUM CHLORIDE, POTASSIUM CHLORIDE, SODIUM LACTATE AND CALCIUM CHLORIDE: 600; 310; 30; 20 INJECTION, SOLUTION INTRAVENOUS at 11:07

## 2025-07-10 RX ADMIN — GABAPENTIN 600 MG: 300 CAPSULE ORAL at 08:07

## 2025-07-10 RX ADMIN — ONDANSETRON 8 MG: 2 INJECTION INTRAMUSCULAR; INTRAVENOUS at 05:07

## 2025-07-10 RX ADMIN — PROCHLORPERAZINE EDISYLATE 2.5 MG: 5 INJECTION INTRAMUSCULAR; INTRAVENOUS at 08:07

## 2025-07-10 RX ADMIN — METHOCARBAMOL 1000 MG: 100 INJECTION INTRAMUSCULAR; INTRAVENOUS at 03:07

## 2025-07-10 RX ADMIN — PROCHLORPERAZINE EDISYLATE 2.5 MG: 5 INJECTION INTRAMUSCULAR; INTRAVENOUS at 03:07

## 2025-07-10 RX ADMIN — ENOXAPARIN SODIUM 40 MG: 30 INJECTION SUBCUTANEOUS at 08:07

## 2025-07-10 RX ADMIN — ONDANSETRON 8 MG: 2 INJECTION INTRAMUSCULAR; INTRAVENOUS at 12:07

## 2025-07-10 NOTE — PT/OT/SLP PROGRESS
Physical Therapy Treatment    Patient Name:  Romario Smith   MRN:  [id number]    Recommendations:     Discharge therapy intensity: Low Intensity Therapy   Discharge Equipment Recommendations:   Barriers to discharge: Inaccessible home and Ongoing medical needs    Assessment:     Romario Smith is a 34 y.o. male admitted with a medical diagnosis of abdominal pain associated with hernia, imaging with large middline umbilical ventral hernia with transverse colon and small intestine within the defect; evidence of small bowel obstruction s/p open primary ventral hernia repair 7/7.  He presents with the following impairments/functional limitations: weakness, impaired endurance, impaired self care skills, impaired functional mobility, gait instability, impaired balance, pain.    Attempted to see patinet in the AM however just received lunch requesting to have therapy later.   Pt with increased ambulation distance compared to yesterday, pt with improved pain level.     Rehab Prognosis: Good; patient would benefit from acute skilled PT services to address these deficits and reach maximum level of function.    Recent Surgery: Procedure(s) (LRB):  REPAIR, VENTRAL HERNIA - OPEN (N/A) 3 Days Post-Op    Plan:     During this hospitalization, patient would benefit from acute PT services 5 x/week to address the identified rehab impairments via gait training, therapeutic activities, therapeutic exercises and progress toward the following goals:    Plan of Care Expires:  08/08/25    Subjective     Chief Complaint: abdominal pain  Patient/Family Comments/goals: none stated  Pain/Comfort:  Pain Rating 1:  (not rated)  Location - Orientation 1: generalized  Location 1: abdomen  Pain Addressed 1: Reposition, Distraction      Objective:     Communicated with nursing prior to session.  Patient found standing EOB with peripheral IV, MATT drain (abd binder) upon PT entry to room.     General Precautions: Standard, fall (abd binder at all  times)  Orthopedic Precautions: N/A  Braces: N/A  Respiratory Status: Room air  Blood Pressure: NT    Functional Mobility:  Bed Mobility:     Transfers:     Sit to Stand:  contact guard assistance with no AD  Gait: 200' w/no AD, SBA  Slow cautious gait pattern    Therapeutic Activities/Exercises:      Co-Treatment: No    Education:  Patient provided with verbal education education regarding PT role/goals/POC, fall prevention, safety awareness, and discharge/DME recommendations.  Understanding was verbalized.     Patient left up in chair with all lines intact, call button in reach, and nurse notified      GOALS:   Multidisciplinary Problems       Physical Therapy Goals          Problem: Physical Therapy    Goal Priority Disciplines Outcome Interventions   Physical Therapy Goal     PT, PT/OT Progressing    Description: Goals to be met by: 25     Patient will increase functional independence with mobility by performin. Supine to sit with Modified [place]  2. Sit to supine with Modified [place]  3. Sit to stand transfer with Modified [place]  4. Bed to chair transfer with Modified [place] using LRAD  5. Gait  x 150 feet with Modified [place] using LRAD.   6. Ascend/descend 1 flight of stairs with bilateral Handrails Stand-by Assistance using LRAD.                          Time Tracking:     PT Received On: 07/10/25  PT Start Time: 1444     PT Stop Time: 1458  PT Total Time (min): 14 min     Billable Minutes: Gait Training 1    Treatment Type: Treatment  PT/PTA: PTA     Number of PTA visits since last PT visit: 2     07/10/2025

## 2025-07-10 NOTE — PLAN OF CARE
Problem: Adult Inpatient Plan of Care  Goal: Plan of Care Review  Outcome: Progressing  Goal: Patient-Specific Goal (Individualized)  Outcome: Progressing  Goal: Absence of Hospital-Acquired Illness or Injury  Outcome: Progressing  Goal: Optimal Comfort and Wellbeing  Outcome: Progressing  Goal: Readiness for Transition of Care  Outcome: Progressing     Problem: Bariatric Environmental Safety  Goal: Safety Maintained with Care  Outcome: Progressing     Problem: Wound  Goal: Optimal Coping  Outcome: Progressing  Goal: Optimal Functional Ability  Outcome: Progressing  Goal: Absence of Infection Signs and Symptoms  Outcome: Progressing  Goal: Improved Oral Intake  Outcome: Progressing  Goal: Optimal Pain Control and Function  Outcome: Progressing  Goal: Skin Health and Integrity  Outcome: Progressing  Goal: Optimal Wound Healing  Outcome: Progressing     Problem: Skin Injury Risk Increased  Goal: Skin Health and Integrity  Outcome: Progressing     Problem: Infection  Goal: Absence of Infection Signs and Symptoms  Outcome: Progressing     Problem: Fall Injury Risk  Goal: Absence of Fall and Fall-Related Injury  Outcome: Progressing

## 2025-07-10 NOTE — PROCEDURES
"Romario Smith is a 34 y.o. male patient.    Temp: 99 °F (37.2 °C) (07/10/25 0429)  Pulse: 90 (07/10/25 0429)  Resp: 20 (07/09/25 1623)  BP: 131/77 (07/10/25 0429)  SpO2: (!) 93 % (07/10/25 0429)  Weight: (!) 154.5 kg (340 lb 9.8 oz) (07/07/25 1310)  Height: 5' 10" (177.8 cm) (07/07/25 1310)    PICC  Date/Time: 7/10/2025 6:30 AM  Performed by: Judy Morales RN  Consent Done: Yes  Time out: Immediately prior to procedure a time out was called to verify the correct patient, procedure, equipment, support staff and site/side marked as required  Indications: vascular access  Anesthesia: local infiltration  Local anesthetic: lidocaine 1% without epinephrine  Anesthetic Total (mL): 5  Preparation: skin prepped with ChloraPrep  Skin prep agent dried: skin prep agent completely dried prior to procedure  Sterile barriers: all five maximum sterile barriers used - cap, mask, sterile gown, sterile gloves, and large sterile sheet  Hand hygiene: hand hygiene performed prior to central venous catheter insertion  Location details: right basilic  Catheter type: single lumen  Catheter size: 4 Fr  Catheter Length: 17cm    Ultrasound guidance: yes  Vessel Caliber: medium and patent, compressibility normal  Needle advanced into vessel with real time Ultrasound guidance.  Guidewire confirmed in vessel.  Sterile sheath used.  Number of attempts: 1  Post-procedure: blood return through all ports, sterile dressing applied and chlorhexidine patch    Complications: none  Comments: Arm circumference 41cm          Name A Carmen SELF  7/10/2025    "

## 2025-07-10 NOTE — PT/OT/SLP PROGRESS
"Occupational Therapy   Treatment    Name: Romario Smith  MRN: [id number]    Recommendations:     Recommended therapy intensity at discharge: Low Intensity Therapy   Discharge Equipment Recommendations:  other (see comments) (bariatric rolling walker and shower chair)      Assessment:     Romario Smith is a 34 y.o. male with a medical diagnosis of Ventral hernia with obstruction.  He presents with flat affect, minimal interactions, questionable motivation. Performance deficits affecting function are weakness, impaired endurance, impaired self care skills, gait instability, decreased safety awareness, pain.         Rehab Prognosis:  Good; patient would benefit from acute skilled OT services to address these deficits and reach maximum level of function.       Plan:     Patient to be seen 5 x/week to address the above listed problems via self-care/home management, therapeutic activities, therapeutic exercises  Plan of Care Expires: 08/05/25  Plan of Care Reviewed with: patient    Subjective     Pain/Comfort:  Pain Rating 1: 6/10  Location - Orientation 1: generalized  Location 1: abdomen  Pain Addressed 1: Reposition, Distraction (Already had meds)    Objective:     Communicated with: Nurse prior to session.  Patient found standing between bedside chair and bed with peripheral IV, MATT drain (abdominal binder) upon OT entry to room.    General Precautions: Standard, fall, other (see comments) (abdominal binder at all times)    Orthopedic Precautions:N/A  Braces: N/A  Respiratory Status: Room air     Occupational Performance:     Activities of Daily Living:  Patient is standing near bedside, requesting for bed to be "fixed". It is found to be unlocked. [place] adjusts and locks bed. He steps to bed and sits with no assist, but is supervised d/t IV and line  Patient complains of nausea, but reports he has already received meds  According to recent OT notes, patient struggles with LB dressing/footwear donning d/t pain and " "body habitus. [place] introduces and instructs on reacher and sock aide. Patient is mildly receptive, but "ain't got the strength" to attempt demo replication.   At end of session, he requests to remain EOB and is given call bell and bedside table.       Therapeutic Positioning  OT interventions performed during the course of today's session in an effort to prevent and/or reduce acquired pressure injuries:   Education was provided on benefits of and recommendations for therapeutic positioning      Co-Treatment: No    Patient Education:  Patient provided with verbal education and demonstrations education regarding OT role/goals/POC, safety awareness, and select hip kit items.  Understanding was verbalized, however additional teaching warranted.      Patient left sitting edge of bed with all lines intact, call button in reach, and Nurse notified.    GOALS:   Multidisciplinary Problems       Occupational Therapy Goals          Problem: Occupational Therapy    Goal Priority Disciplines Outcome Interventions   Occupational Therapy Goal     OT, PT/OT Progressing    Description: LTG: Pt will perform basic ADLs and ADL transfers with Modified independence using LRAD by discharge.    STG: to be met by 7/22    Pt will complete grooming standing at sink with LRAD with SBA.  Pt will complete UB dressing with SBA.  Pt will complete LB dressing with SBA using LRAD.  Pt will complete toileting with SBA using LRAD.  Pt will complete functional mobility to/from toilet and toilet transfer with SBA using LRAD.                         Time Tracking:     OT Date of Treatment: 07/10/25  OT Start Time: 1553  OT Stop Time: 1608  OT Total Time (min): 15 min    Billable Minutes:Self Care/Home Management 15    OTR/L readily available for conference at the time of the provision of services: BERTRAM Schmidt  OT/AMANDA: AMANDA     Number of AMANDA visits since last OT visit: 1    7/10/2025     "

## 2025-07-11 ENCOUNTER — TELEPHONE (OUTPATIENT)
Facility: CLINIC | Age: 35
End: 2025-07-11
Payer: MEDICAID

## 2025-07-11 VITALS
TEMPERATURE: 98 F | SYSTOLIC BLOOD PRESSURE: 133 MMHG | HEART RATE: 71 BPM | DIASTOLIC BLOOD PRESSURE: 82 MMHG | RESPIRATION RATE: 16 BRPM | BODY MASS INDEX: 45.1 KG/M2 | WEIGHT: 315 LBS | HEIGHT: 70 IN | OXYGEN SATURATION: 96 %

## 2025-07-11 PROCEDURE — 99233 SBSQ HOSP IP/OBS HIGH 50: CPT | Mod: ,,, | Performed by: SURGERY

## 2025-07-11 PROCEDURE — 63600175 PHARM REV CODE 636 W HCPCS

## 2025-07-11 PROCEDURE — 25000003 PHARM REV CODE 250

## 2025-07-11 RX ORDER — HYDROCODONE BITARTRATE AND ACETAMINOPHEN 5; 325 MG/1; MG/1
1 TABLET ORAL EVERY 6 HOURS PRN
Qty: 15 TABLET | Refills: 0 | Status: SHIPPED | OUTPATIENT
Start: 2025-07-11 | End: 2025-07-16

## 2025-07-11 RX ORDER — PROCHLORPERAZINE MALEATE 10 MG
10 TABLET ORAL 3 TIMES DAILY PRN
Qty: 21 TABLET | Refills: 0 | Status: SHIPPED | OUTPATIENT
Start: 2025-07-11 | End: 2025-07-18

## 2025-07-11 RX ADMIN — SODIUM CHLORIDE, POTASSIUM CHLORIDE, SODIUM LACTATE AND CALCIUM CHLORIDE: 600; 310; 30; 20 INJECTION, SOLUTION INTRAVENOUS at 04:07

## 2025-07-11 RX ADMIN — GABAPENTIN 600 MG: 300 CAPSULE ORAL at 08:07

## 2025-07-11 RX ADMIN — ONDANSETRON 8 MG: 2 INJECTION INTRAMUSCULAR; INTRAVENOUS at 04:07

## 2025-07-11 RX ADMIN — ONDANSETRON 8 MG: 2 INJECTION INTRAMUSCULAR; INTRAVENOUS at 12:07

## 2025-07-11 RX ADMIN — ONDANSETRON 8 MG: 2 INJECTION INTRAMUSCULAR; INTRAVENOUS at 08:07

## 2025-07-11 NOTE — HOSPITAL COURSE
Romario Smith is a 34-year-old male admitted with ventral hernia with transverse colon and small intestine status post open primary ventral hernia repair 07/07/2025.  Tolerating diet.  Having bowel movements.  Pain controlled.  Meeting discharge criteria for home.

## 2025-07-11 NOTE — PLAN OF CARE
07/11/25 1249   Final Note   Assessment Type Final Discharge Note   Anticipated Discharge Disposition Home   Post-Acute Status   Discharge Delays None known at this time

## 2025-07-11 NOTE — DISCHARGE SUMMARY
[address]  General Surgery  Discharge Summary      Patient Name: Romario Smith  MRN: [id number]  Admission Date: 7/7/2025  Hospital Length of Stay: 3 days  Discharge Date and Time: 07/11/2025 12:47 PM  Attending Physician: Rony Brown Jr., *   Discharging Provider: Owen Chaudhary PA-C  Primary Care Provider: Phuong, Primary Doctor    HPI:   No notes on file    Procedure(s) (LRB):  REPAIR, VENTRAL HERNIA - OPEN (N/A)      Indwelling Lines/Drains at time of discharge:   Lines/Drains/Airways       Drain  Duration                  Closed/Suction Drain 07/07/25 Medial RUQ Bulb 19 Fr. 4 days                  Hospital Course: Romario Smith is a 34-year-old male admitted with ventral hernia with transverse colon and small intestine status post open primary ventral hernia repair 07/07/2025.  Tolerating diet.  Having bowel movements.  Pain controlled.  Meeting discharge criteria for home.    Goals of Care Treatment Preferences:  Code Status: Full Code      Consults:   Consults (From admission, onward)          Status Ordering Provider     Inpatient consult to Social Work/Case Management  Once        Provider:  (Not yet assigned)    Completed OWEN CHAUDHARY     Inpatient consult to Midline team  Once        Provider:  (Not yet assigned)    Acknowledged DARIANA LORENZ     Inpatient consult to Social Work/Case Management  Once        Provider:  (Not yet assigned)    Completed ALBERTO CARSON            Significant Diagnostic Studies: N/A    Pending Diagnostic Studies:       None          Final Active Diagnoses:    Diagnosis Date Noted POA    PRINCIPAL PROBLEM:  Ventral hernia with obstruction [K43.6] 07/07/2025 Yes    S/P exploratory laparotomy [Z98.890] 07/09/2025 Not Applicable      Problems Resolved During this Admission:      Discharged Condition: good    Disposition: Home or Self Care    Follow Up:   Follow-up Information       Clinic, Acute Care Surgery Follow up on 7/15/2025.   "  Why: July 15th @10:30am  Contact information:  [address]  [facility] [address]  [phone]               No, Primary Doctor Follow up.                           Patient Instructions:      WALKER FOR HOME USE     Order Specific Question Answer Comments   Type of Walker: Heavy duty (300+ lbs)    With wheels? Yes    Height: 5' 10" (1.778 m)    Weight: 154.5 kg (340 lb 9.8 oz)    Length of need (1-99 months): 6    Does patient have medical equipment at home? none    Please check all that apply: Patient's condition impairs ambulation.    Please check all that apply: Patient is unable to safely ambulate without equipment.      Notify your health care provider if you experience any of the following:  temperature >100.4     Notify your health care provider if you experience any of the following:  persistent nausea and vomiting or diarrhea     Notify your health care provider if you experience any of the following:  severe uncontrolled pain     Notify your health care provider if you experience any of the following:  redness, tenderness, or signs of infection (pain, swelling, redness, odor or green/yellow discharge around incision site)     Notify your health care provider if you experience any of the following:  difficulty breathing or increased cough     Lifting restrictions   Order Comments: No heavy lifting greater than 10 lb for 8 weeks     Medications:  Reconciled Home Medications:      Medication List        START taking these medications      HYDROcodone-acetaminophen 5-325 mg per tablet  Commonly known as: NORCO  Take 1 tablet by mouth every 8 (eight) hours as needed for Pain.     ondansetron 4 MG Tbdl  Commonly known as: ZOFRAN-ODT  Take 1 tablet (4 mg total) by mouth every 8 (eight) hours as needed.            STOP taking these medications      acetaminophen 500 MG tablet  Commonly known as: TYLENOL     ARIPiprazole 5 MG Tab  Commonly known as: ABILIFY     gabapentin 800 MG tablet  Commonly " known as: NEURONTIN     ibuprofen 600 MG tablet  Commonly known as: BONNIE TRUJILLO PA-C  General Surgery  [facility] - [address] [place] [address] Surg

## 2025-07-11 NOTE — TELEPHONE ENCOUNTER
Name....: Edda  ......w/: [facility]  Phone #.: [phone]  Patient.: Romario Smith  Pt .: 1990  Details.: Needs to schedule a follow up appt in 2 weeks    Contacted edda and made her aware pt can come to the acute care clinic on 7/15 @9590

## 2025-07-11 NOTE — PROGRESS NOTES
"   Acute Care Surgery   Progress Note  Admit Date: 7/7/2025  HD#3  POD#4 Days Post-Op    Subjective:   Interval history:  NAEON; AF, HDS   Voiding spontaneously  Denies vomiting  Nausea controlled with anti emetics  Has not eaten anything. Tolerating liquids. Wants to trial breakfast today  +BMs  MATT Drain 25 cc serosanguineous     Home Meds:  Current Outpatient Medications   Medication Instructions    acetaminophen (TYLENOL) 1,000 mg, Oral, Every 6 hours PRN    ARIPiprazole (ABILIFY) 5 mg, Oral    gabapentin (NEURONTIN) 800 mg, 3 times daily    HYDROcodone-acetaminophen (NORCO) 5-325 mg per tablet 1 tablet, Oral, Every 8 hours PRN    ibuprofen (ADVIL,MOTRIN) 600 mg, Oral, Every 6 hours PRN    ondansetron (ZOFRAN-ODT) 4 mg, Oral, Every 8 hours PRN      Scheduled Meds:   acetaminophen  650 mg Oral Q6H    docusate sodium  50 mg Oral Daily    enoxaparin  40 mg Subcutaneous Q12H    gabapentin  600 mg Oral TID    polyethylene glycol  17 g Oral Daily     Continuous Infusions:   lactated ringers   Intravenous Continuous 125 mL/hr at 07/11/25 0438 New Bag at 07/11/25 0438     PRN Meds:  Current Facility-Administered Medications:     morphine, 2 mg, Intravenous, Q6H PRN    ondansetron, 8 mg, Intravenous, Q4H PRN    oxyCODONE, 5 mg, Oral, Q6H PRN    oxyCODONE, 10 mg, Oral, Q6H PRN    prochlorperazine, 2.5 mg, Intravenous, Q4H PRN    sodium chloride 0.9%, 10 mL, Intravenous, PRN    Flushing PICC/Midline Protocol, , , Until Discontinued **AND** sodium chloride 0.9%, 10 mL, Intravenous, Q12H PRN     Objective:     VITAL SIGNS: 24 HR MIN & MAX LAST   Temp  Min: 98.5 °F (36.9 °C)  Max: 99.1 °F (37.3 °C)  98.9 °F (37.2 °C)   BP  Min: 115/78  Max: 136/88  120/78    Pulse  Min: 72  Max: 89  74    Resp  Min: 18  Max: 18  18    SpO2  Min: 94 %  Max: 96 %  95 %      HT: 5' 10" (177.8 cm)  WT: (!) 154.5 kg (340 lb 9.8 oz)  BMI: 48.9     Intake/output:  Intake/Output - Last 3 Shifts         07/09 0700  07/10 0659 07/10 0700  07/11 0659    " P.O. 360 240    I.V. (mL/kg)  2908.7 (18.8)    IV Piggyback  223.2    Total Intake(mL/kg) 360 (2.3) 3371.9 (21.8)    Urine (mL/kg/hr)  275 (0.1)    Drains 30 35    Stool  0    Total Output 30 310    Net +330 +3061.9          Unmeasured Urine Occurrence 1 x 3 x    Unmeasured Stool Occurrence 2 x 3 x            Intake/Output Summary (Last 24 hours) at 7/11/2025 0628  Last data filed at 7/11/2025 0443  Gross per 24 hour   Intake 3371.89 ml   Output 310 ml   Net 3061.89 ml           Lines/drains/airway:  Peripheral IV Single Lumen 07/07/25 0217 20 G Posterior;Proximal;Right Forearm (Active)   Site Assessment Clean;Dry;Intact 07/08/25 0305   Line Securement Device Secured with sutureless device 07/07/25 1800   Extremity Assessment Distal to IV No abnormal discoloration;No redness;No swelling;No warmth 07/07/25 1800   Line Status Infusing 07/08/25 0305   Dressing Status Clean;Dry;Intact 07/08/25 0305   Dressing Intervention Integrity maintained 07/08/25 0305   Number of days: 1       Peripheral IV Single Lumen 07/07/25 0815 18 G Left Antecubital (Active)   Site Assessment Clean;Dry;Intact 07/08/25 0305   Line Securement Device Secured with sutureless device 07/07/25 1800   Extremity Assessment Distal to IV No abnormal discoloration;No redness;No swelling;No warmth 07/07/25 1800   Line Status Saline locked 07/08/25 0305   Dressing Status Clean;Dry;Intact 07/08/25 0305   Dressing Intervention Integrity maintained 07/08/25 0305   Number of days: 0            Closed/Suction Drain 07/07/25 Medial RUQ Bulb 19 Fr. (Active)   Site Description Unable to view 07/08/25 0200   Dressing Type Gauze;Transparent (Tegaderm) 07/08/25 0200   Dressing Status Clean;Dry;Intact 07/08/25 0200   Dressing Intervention Integrity maintained 07/08/25 0200   Drainage Sanguineous 07/08/25 0200   Status To bulb suction 07/08/25 0200   Output (mL) 20 mL 07/07/25 1800   Number of days: 1       Physical examination:  Gen: NAD, AAOx3, answering questions  "appropriately  HEENT: normocephalic, atraumatic  CV: RR  Resp: NWOB  Abd: Soft, non-distended; appropriately tender to palpation along midline surgical incision site. Dressings in place; clean/dry, non-saturated.   Ext: moving all extremities spontaneously and purposefully   LDA: MATT drain within right subcutaneous abdominal wall     Labs:  Renal:  Recent Labs     07/08/25  0651 07/09/25  0623 07/10/25  0443   BUN 12.8 11.5 11.4   CREATININE 0.69* 0.66* 0.74     No results for input(s): "LACTIC" in the last 72 hours.  FENGI:  Recent Labs     07/08/25  0651 07/09/25  0623 07/10/25  0443    141 141   K 3.9 3.5 3.4*    103 101   CO2 29 26 29   CALCIUM 8.1* 8.9 8.9   MG 2.00 1.70 1.80   PHOS 2.8 2.7 2.2*   PROT  --  7.0 7.2   ALBUMIN  --  2.9* 2.9*   BILITOT  --  0.6 0.4   AST  --  16 14   ALKPHOS  --  60 63   ALT  --  17 19     Heme:  Recent Labs     07/08/25  0651 07/09/25  0623 07/10/25  0443   HGB 12.1* 12.4* 12.2*   HCT 38.9* 40.6* 40.2*    271 268     ID:  Recent Labs     07/08/25  0651 07/09/25  0623 07/10/25  0443   WBC 6.57 5.55  5.55 7.82     CBG:  No results for input(s): "GLUCOSE" in the last 72 hours.   Cardiovascular:  No results for input(s): "TROPONINI", "CKTOTAL", "CKMB", "BNP" in the last 168 hours.  I have reviewed all pertinent lab results within the past 24 hours.    Imaging:  X-Ray Abdomen AP 1 View   Final Result      Dilated loops of small bowel distributed in a stepladder fashion which might be suggest the presence of a small bowel obstruction follow-up examination is suggested         Electronically signed by: Moris Osei   Date:    07/10/2025   Time:    09:10      CT Abdomen Pelvis With IV Contrast NO Oral Contrast   Final Result      Midline ventral hernia containing transverse colon and small bowel with resultant small bowel obstruction.  There is no evidence of ischemia or perforation.  There is a small amount of free fluid in the herniating sac with associated " mesenteric inflammatory stranding consistent with incarceration and resultant obstruction.      [employer] concordance         Electronically signed by: All Simmons MD   Date:    07/07/2025   Time:    04:20         I have reviewed all pertinent imaging results/findings within the past 24 hours.    Micro/Path/Other:  Microbiology Results (last 7 days)       ** No results found for the last 168 hours. **           Pathology Results  (Last 7 days)                 07/07/25 0823  Specimen to Pathology General Surgery Final result             Assessment & Plan:   34 year old male without significant PMHx who presented to [facility] 7[facility]7 with abdominal pain associated with hernia. General surgery consulted to evaluate. Imaging with large midline umbilical ventral hernia with transverse colon and small intestine within the defect; evidence of small bowel obstruction. Presentation consistent with incarcerated, non-strangulated ventral hernia. S/p open primary ventral hernia repair 7/7.     -regular diet  - will dc mIVF once tolerating regular diet   -  Anti-emetics PRN   - Multimodal pain control   - ok to leave abdominal wound open to air   - Abdominal binder at all times   - PT/OT; encourage OOB to chair & ambulation   - DVT ppx - lovenox 60 daily  - Offer smoking cessation     - discharge today if tolerating breakfast and nausea controlled      7/11/2025     The above findings, diagnostics, and treatment plan were discussed with Dr. Rony Brown who will follow with further assessments and recommendations. Please call with any questions, concerns, or clinical status changes.  This note/OR report was created with the assistance of  voice recognition software or phone  dictation.  There may be transcription errors as a result of using this technology however minimal. Effort has been made to assure accuracy of transcription but any obvious errors or omissions should be clarified with the author of the document.

## 2025-07-14 ENCOUNTER — TELEPHONE (OUTPATIENT)
Facility: CLINIC | Age: 35
End: 2025-07-14
Payer: MEDICAID

## 2025-07-14 NOTE — ANESTHESIA POSTPROCEDURE EVALUATION
Anesthesia Post Evaluation    Patient: Romario Smith    Procedure(s) Performed: Procedure(s) (LRB):  REPAIR, VENTRAL HERNIA - OPEN (N/A)    Final Anesthesia Type: general      Patient location during evaluation: PACU  Patient participation: Yes- Able to Participate  Level of consciousness: awake and alert  Post-procedure vital signs: reviewed and stable  Pain management: adequate  Airway patency: patent      Anesthetic complications: no      Cardiovascular status: hemodynamically stable  Respiratory status: unassisted  Hydration status: euvolemic  Follow-up not needed.              Vitals Value Taken Time   /82 07/11/25 12:47   Temp 36.8 °C (98.3 °F) 07/11/25 12:47   Pulse 71 07/11/25 12:47   Resp 16 07/11/25 12:47   SpO2 96 % 07/11/25 12:47         Event Time   Out of Recovery 10:10:00         Pain/Maycol Score: No data recorded

## 2025-07-15 ENCOUNTER — TELEPHONE (OUTPATIENT)
Facility: CLINIC | Age: 35
End: 2025-07-15
Payer: MEDICAID

## 2025-07-15 NOTE — TELEPHONE ENCOUNTER
Pt contacted the clinic to reschedule his appt with the acute care clinic due to transportation issues let pt know he can come 7/22 @1018

## 2025-07-21 ENCOUNTER — TELEPHONE (OUTPATIENT)
Facility: CLINIC | Age: 35
End: 2025-07-21
Payer: MEDICAID

## 2025-07-22 ENCOUNTER — TELEPHONE (OUTPATIENT)
Dept: SURGERY | Facility: CLINIC | Age: 35
End: 2025-07-22

## 2025-07-22 ENCOUNTER — TELEPHONE (OUTPATIENT)
Facility: CLINIC | Age: 35
End: 2025-07-22
Payer: MEDICAID

## 2025-07-22 NOTE — TELEPHONE ENCOUNTER
Name....: Romario Smith  Phone #.: [phone]  Patient.: He Is Pt  Pt .: 1990  Primary.: Unsure  Details.: RYC reg r/s appt    Attempted to contact to r/s no answer unable to LVm

## 2025-07-22 NOTE — TELEPHONE ENCOUNTER
Pt could not come to appt today bc he did not have ride- I told him yall would give him a call after clinic to r/s appt- [phone]     I receive this message from Mark. I attempt to call to r/s the patient at this time, no answer, did leave a voicemail.

## 2025-07-28 ENCOUNTER — TELEPHONE (OUTPATIENT)
Facility: CLINIC | Age: 35
End: 2025-07-28
Payer: MEDICAID

## 2025-07-29 ENCOUNTER — OFFICE VISIT (OUTPATIENT)
Dept: SURGERY | Facility: CLINIC | Age: 35
End: 2025-07-29
Payer: MEDICAID

## 2025-07-29 VITALS
SYSTOLIC BLOOD PRESSURE: 134 MMHG | WEIGHT: 315 LBS | OXYGEN SATURATION: 96 % | BODY MASS INDEX: 45.1 KG/M2 | TEMPERATURE: 98 F | HEART RATE: 116 BPM | HEIGHT: 70 IN | DIASTOLIC BLOOD PRESSURE: 104 MMHG

## 2025-07-29 DIAGNOSIS — T81.30XA WOUND DEHISCENCE: ICD-10-CM

## 2025-07-29 DIAGNOSIS — L02.92 DEEP FOLLICULITIS: ICD-10-CM

## 2025-07-29 DIAGNOSIS — K43.6 VENTRAL HERNIA WITH OBSTRUCTION: ICD-10-CM

## 2025-07-29 DIAGNOSIS — Z98.890 S/P EXPLORATORY LAPAROTOMY: Primary | ICD-10-CM

## 2025-07-29 RX ORDER — GABAPENTIN 600 MG/1
600 TABLET ORAL 3 TIMES DAILY
COMMUNITY
Start: 2025-02-01

## 2025-07-29 RX ORDER — IBUPROFEN 800 MG/1
800 TABLET, FILM COATED ORAL 2 TIMES DAILY PRN
COMMUNITY
Start: 2025-03-03

## 2025-07-29 RX ORDER — AMOXICILLIN AND CLAVULANATE POTASSIUM 875; 125 MG/1; MG/1
1 TABLET, FILM COATED ORAL EVERY 12 HOURS
Qty: 10 TABLET | Refills: 0 | Status: SHIPPED | OUTPATIENT
Start: 2025-07-29 | End: 2025-07-31 | Stop reason: SDUPTHER

## 2025-07-29 RX ORDER — BUPRENORPHINE 300 MG/1
SOLUTION SUBCUTANEOUS
COMMUNITY
Start: 2025-07-02

## 2025-07-29 NOTE — ASSESSMENT & PLAN NOTE
- daily dressing changes  - do not use hydrogen peroxide to clean wound  - gently cleanse with soap and water  - do not soak or submerge in water  - staples removed  - monitor for signs of infection  - f/u in 2 weeks

## 2025-07-29 NOTE — PROGRESS NOTES
Acute Care Surgery Clinic Note    Date of surgery/procedure: July 7, 2025  Operation/Procedure: Ex lap, ventral hernia repair, MATT drain placement  Surgeon: Dr. Juan Francisco Parson    Subjective:   Romario Smith is a 34 y.o. male who presents to the clinic for a follow-up.  He has missed his last 2 scheduled clinic visits.  Reports the MATT drain fell out on its own.  He reports it was no longer making any output at the time it fell out.  He still has staples in his midline incision.  There is crusting around the staples and some mild dehiscence near the umbilicus.  All staples removed.  Wound cleaned with saline.    Denies nausea/vomiting, changes in bowel movements, or inability to tolerate diet, or abdominal pain.  His only complaint is significant pain in his buttocks which she believes as a boil.  He reports he had 1 while he was in the hospital that popped.  He reports his wound was new and he has had the occasional low-grade fever with it.  He reports he does not have established care with a primary care physician.      Past medical history:  History reviewed. No pertinent past medical history.  History reviewed. No pertinent past medical history.  Past surgical history:  Past Surgical History:   Procedure Laterality Date    REPAIR, HERNIA, VENTRAL N/A 7/7/2025    Procedure: REPAIR, VENTRAL HERNIA - OPEN;  Surgeon: Rony Brown Jr., MD;  Location: [facility];  Service: General;  Laterality: N/A;     Family history:  No family history on file.  Social history:  Social History     Socioeconomic History    Marital status: Single   Tobacco Use    Smoking status: Every Day     Current packs/day: 0.50     Types: Cigarettes    Smokeless tobacco: Never   Substance and Sexual Activity    Drug use: Yes     Comment: Heroin     Social Drivers of Health     Financial Resource Strain: Low Risk  (7/8/2025)    Overall Financial Resource Strain (CARDIA)     Difficulty of Paying Living Expenses: Not hard at all   Food  Insecurity: No Food Insecurity (2025)    Hunger Vital Sign     Worried About Running Out of Food in the Last Year: Never true     Ran Out of Food in the Last Year: Never true   Transportation Needs: No Transportation Needs (2025)    PRAPARE - Transportation     Lack of Transportation (Medical): No     Lack of Transportation (Non-Medical): No   Stress: No Stress Concern Present (2025)    [employer] - Occupational Stress Questionnaire     Feeling of Stress : Not at all   Housing Stability: Low Risk  (2025)    Housing Stability Vital Sign     Unable to Pay for Housing in the Last Year: No     Homeless in the Last Year: No     Tobacco Use History[1]   Social History     Substance and Sexual Activity   Alcohol Use None      Allergies: Review of patient's allergies indicates:  No Known Allergies  Home Meds:   Current Outpatient Medications   Medication Instructions    gabapentin (NEURONTIN) 600 mg, 3 times daily    ibuprofen (ADVIL,MOTRIN) 800 mg, 2 times daily PRN    SUBLOCADE 300 mg/1.5 mL injection SMARTSI.5 Milliliter(s) SUB-Q Once a Month    Medications Ordered Prior to Encounter[2]   Current Outpatient Medications on File Prior to Visit   Medication Sig    gabapentin (NEURONTIN) 600 MG tablet Take 600 mg by mouth 3 (three) times daily.    ibuprofen (ADVIL,MOTRIN) 800 MG tablet Take 800 mg by mouth 2 (two) times daily as needed.    SUBLOCADE 300 mg/1.5 mL injection SMARTSI.5 Milliliter(s) SUB-Q Once a Month     No current facility-administered medications on file prior to visit.      Outpatient Medications as of 2025   Medication Sig Dispense Refill    gabapentin (NEURONTIN) 600 MG tablet Take 600 mg by mouth 3 (three) times daily.      ibuprofen (ADVIL,MOTRIN) 800 MG tablet Take 800 mg by mouth 2 (two) times daily as needed.      SUBLOCADE 300 mg/1.5 mL injection SMARTSI.5 Milliliter(s) SUB-Q Once a Month       No current facility-administered medications  "on file as of 7/29/2025.        ROS  A 12-point review of systems was performed with pertinent positives and negatives as per HPI     Objective:   Vitals:  Vitals:    07/29/25 1044   BP: (!) 134/104   Pulse: (!) 116   Temp: 97.8 °F (36.6 °C)      Vitals:    07/29/25 1044   BP: (!) 134/104   Pulse: (!) 116   Temp: 97.8 °F (36.6 °C)   SpO2: 96%   Weight: (!) 154.5 kg (340 lb 9.8 oz)   Height: 5' 10" (1.778 m)       Physical Exam:  General: NAD  HEENT: Normocephalic  CV: RR  Pulm: NWOB on RA   Abd: Soft, ND, NTTP, no rebound, no guarding  Extremities: no edema in bilateral lower extremities   Skin:  Incision Clean/Dry/Intact. No evidence of infection.      Pathology:  N/a  Micro:  N/a    VISIT DIAGNOSES:    ICD-10-CM ICD-9-CM   1. S/P exploratory laparotomy  Z98.890 V45.89   2. Ventral hernia with obstruction  K43.6 552.20   3. Wound dehiscence  T81.30XA 998.30   4. Deep folliculitis  L02.92 704.8       Plan:  -S/P exploratory laparotomy    Ventral hernia with obstruction    Wound dehiscence  Assessment & Plan:  - daily dressing changes  - do not use hydrogen peroxide to clean wound  - gently cleanse with soap and water  - do not soak or submerge in water  - staples removed  - monitor for signs of infection  - f/u in 2 weeks        Deep folliculitis  Assessment & Plan:  -deep folliculitis on L buttocks  -patient complaining of significant pain in the area and occasional low grade fevers  -Augmentin x 5 days  -advised to establish care with a PCP for further follow up          - ED precautions given  - Follow up PCP   - Return 2 weeks, no scheduled appointment needed. Call for concerns.    No future appointments.       The above findings, diagnostics, and treatment plan were discussed with Dr. Juan Francisco Parson who is in agreement with the plan of care except as stated in additional documentation.              [1]   Social History  Tobacco Use   Smoking Status Every Day    Current packs/day: 0.50    Types: Cigarettes "   Smokeless Tobacco Never   [2]   Current Outpatient Medications on File Prior to Visit   Medication Sig Dispense Refill    gabapentin (NEURONTIN) 600 MG tablet Take 600 mg by mouth 3 (three) times daily.      ibuprofen (ADVIL,MOTRIN) 800 MG tablet Take 800 mg by mouth 2 (two) times daily as needed.      SUBLOCADE 300 mg/1.5 mL injection SMARTSI.5 Milliliter(s) SUB-Q Once a Month       No current facility-administered medications on file prior to visit.

## 2025-07-29 NOTE — ASSESSMENT & PLAN NOTE
-deep folliculitis on L buttocks  -patient complaining of significant pain in the area and occasional low grade fevers  -Augmentin x 5 days  -advised to establish care with a PCP for further follow up

## 2025-07-31 ENCOUNTER — TELEPHONE (OUTPATIENT)
Facility: CLINIC | Age: 35
End: 2025-07-31
Payer: MEDICAID

## 2025-07-31 RX ORDER — AMOXICILLIN AND CLAVULANATE POTASSIUM 875; 125 MG/1; MG/1
1 TABLET, FILM COATED ORAL EVERY 12 HOURS
Qty: 10 TABLET | Refills: 0 | Status: SHIPPED | OUTPATIENT
Start: 2025-07-31 | End: 2025-08-05

## 2025-07-31 NOTE — TELEPHONE ENCOUNTER
Pt contact the clinic stating he went to [facility] attempting to get his abx but they stated they havent received it and now would like it to be sent to [facility] [place] sent message to nakia RAMIREZ

## (undated) DEVICE — CHLORAPREP W TINT 26ML APPL

## (undated) DEVICE — Device

## (undated) DEVICE — NDL HYPO 22GX1 1/2 SYR 10ML LL

## (undated) DEVICE — RESERVOIR JACKSON-PRATT 100CC

## (undated) DEVICE — GLOVE PROTEXIS LTX MICRO  7.5

## (undated) DEVICE — SUT VICRYL 3-0 27 SH

## (undated) DEVICE — STRIP MEDI WND CLSR 1/2X4IN

## (undated) DEVICE — BENZOIN TINCTURE 0.66ML

## (undated) DEVICE — DRAIN SIL RND HUBLSS 19F TRCR

## (undated) DEVICE — SOL NACL IRR 1000ML BTL

## (undated) DEVICE — TRAY CATH 1-LYR URIMTR 16FR

## (undated) DEVICE — SUT 2-0 12-18IN SILK

## (undated) DEVICE — SUT VICRYL PLUS 4-0 FS-2 27IN

## (undated) DEVICE — STAPLER SKIN PROXIMATE WIDE

## (undated) DEVICE — DRAIN PENROSE SIL .25X12IN

## (undated) DEVICE — SUT 2/0 30IN SILK BLK BRAI

## (undated) DEVICE — SPONGE LAP 18X18 PREWASHED

## (undated) DEVICE — ELECTRODE PATIENT RETURN DISP

## (undated) DEVICE — TAPE MEDIPORE 4IN X 2YDS

## (undated) DEVICE — KIT SURGICAL TURNOVER

## (undated) DEVICE — SUT VICRYL CTD 2-0 GI 27 SH

## (undated) DEVICE — SUT PROLENE 0 CT 30 IN BLUE

## (undated) DEVICE — BINDER ABD 12IN XLG 75-84IN

## (undated) DEVICE — DRAPE FLUID WARMER ORS 44X44IN